# Patient Record
Sex: FEMALE | Race: OTHER | NOT HISPANIC OR LATINO
[De-identification: names, ages, dates, MRNs, and addresses within clinical notes are randomized per-mention and may not be internally consistent; named-entity substitution may affect disease eponyms.]

---

## 2017-06-01 ENCOUNTER — FORM ENCOUNTER (OUTPATIENT)
Age: 52
End: 2017-06-01

## 2021-01-23 ENCOUNTER — INPATIENT (INPATIENT)
Facility: HOSPITAL | Age: 56
LOS: 7 days | Discharge: HOME HEALTH | End: 2021-01-31
Attending: HOSPITALIST | Admitting: HOSPITALIST
Payer: COMMERCIAL

## 2021-01-23 VITALS
OXYGEN SATURATION: 96 % | TEMPERATURE: 99 F | SYSTOLIC BLOOD PRESSURE: 140 MMHG | RESPIRATION RATE: 20 BRPM | HEART RATE: 96 BPM | DIASTOLIC BLOOD PRESSURE: 89 MMHG

## 2021-01-23 DIAGNOSIS — G47.33 OBSTRUCTIVE SLEEP APNEA (ADULT) (PEDIATRIC): ICD-10-CM

## 2021-01-23 DIAGNOSIS — U07.1 COVID-19: ICD-10-CM

## 2021-01-23 DIAGNOSIS — R94.31 ABNORMAL ELECTROCARDIOGRAM [ECG] [EKG]: ICD-10-CM

## 2021-01-23 DIAGNOSIS — E11.9 TYPE 2 DIABETES MELLITUS WITHOUT COMPLICATIONS: ICD-10-CM

## 2021-01-23 DIAGNOSIS — I10 ESSENTIAL (PRIMARY) HYPERTENSION: ICD-10-CM

## 2021-01-23 DIAGNOSIS — Z98.84 BARIATRIC SURGERY STATUS: Chronic | ICD-10-CM

## 2021-01-23 DIAGNOSIS — R09.02 HYPOXEMIA: ICD-10-CM

## 2021-01-23 DIAGNOSIS — Z98.890 OTHER SPECIFIED POSTPROCEDURAL STATES: Chronic | ICD-10-CM

## 2021-01-23 DIAGNOSIS — Z29.9 ENCOUNTER FOR PROPHYLACTIC MEASURES, UNSPECIFIED: ICD-10-CM

## 2021-01-23 LAB
ALBUMIN SERPL ELPH-MCNC: 4.2 G/DL — SIGNIFICANT CHANGE UP (ref 3.3–5)
ALP SERPL-CCNC: 103 U/L — SIGNIFICANT CHANGE UP (ref 40–120)
ALT FLD-CCNC: 27 U/L — SIGNIFICANT CHANGE UP (ref 4–33)
ANION GAP SERPL CALC-SCNC: 15 MMOL/L — HIGH (ref 7–14)
AST SERPL-CCNC: 33 U/L — HIGH (ref 4–32)
BASOPHILS # BLD AUTO: 0.01 K/UL — SIGNIFICANT CHANGE UP (ref 0–0.2)
BASOPHILS NFR BLD AUTO: 0.2 % — SIGNIFICANT CHANGE UP (ref 0–2)
BILIRUB SERPL-MCNC: 0.6 MG/DL — SIGNIFICANT CHANGE UP (ref 0.2–1.2)
BUN SERPL-MCNC: 14 MG/DL — SIGNIFICANT CHANGE UP (ref 7–23)
CALCIUM SERPL-MCNC: 9.5 MG/DL — SIGNIFICANT CHANGE UP (ref 8.4–10.5)
CHLORIDE SERPL-SCNC: 100 MMOL/L — SIGNIFICANT CHANGE UP (ref 98–107)
CO2 SERPL-SCNC: 26 MMOL/L — SIGNIFICANT CHANGE UP (ref 22–31)
CREAT SERPL-MCNC: 0.57 MG/DL — SIGNIFICANT CHANGE UP (ref 0.5–1.3)
CRP SERPL-MCNC: 39.3 MG/L — HIGH
D DIMER BLD IA.RAPID-MCNC: 261 NG/ML DDU — HIGH
EOSINOPHIL # BLD AUTO: 0.01 K/UL — SIGNIFICANT CHANGE UP (ref 0–0.5)
EOSINOPHIL NFR BLD AUTO: 0.2 % — SIGNIFICANT CHANGE UP (ref 0–6)
FERRITIN SERPL-MCNC: 395 NG/ML — HIGH (ref 15–150)
GLUCOSE SERPL-MCNC: 109 MG/DL — HIGH (ref 70–99)
HCT VFR BLD CALC: 46.9 % — HIGH (ref 34.5–45)
HGB BLD-MCNC: 14.4 G/DL — SIGNIFICANT CHANGE UP (ref 11.5–15.5)
IANC: 3.59 K/UL — SIGNIFICANT CHANGE UP (ref 1.5–8.5)
IMM GRANULOCYTES NFR BLD AUTO: 0.4 % — SIGNIFICANT CHANGE UP (ref 0–1.5)
LYMPHOCYTES # BLD AUTO: 1.07 K/UL — SIGNIFICANT CHANGE UP (ref 1–3.3)
LYMPHOCYTES # BLD AUTO: 20.9 % — SIGNIFICANT CHANGE UP (ref 13–44)
MCHC RBC-ENTMCNC: 27.3 PG — SIGNIFICANT CHANGE UP (ref 27–34)
MCHC RBC-ENTMCNC: 30.7 GM/DL — LOW (ref 32–36)
MCV RBC AUTO: 89 FL — SIGNIFICANT CHANGE UP (ref 80–100)
MONOCYTES # BLD AUTO: 0.43 K/UL — SIGNIFICANT CHANGE UP (ref 0–0.9)
MONOCYTES NFR BLD AUTO: 8.4 % — SIGNIFICANT CHANGE UP (ref 2–14)
NEUTROPHILS # BLD AUTO: 3.59 K/UL — SIGNIFICANT CHANGE UP (ref 1.8–7.4)
NEUTROPHILS NFR BLD AUTO: 69.9 % — SIGNIFICANT CHANGE UP (ref 43–77)
NRBC # BLD: 0 /100 WBCS — SIGNIFICANT CHANGE UP
NRBC # FLD: 0 K/UL — SIGNIFICANT CHANGE UP
PLATELET # BLD AUTO: 261 K/UL — SIGNIFICANT CHANGE UP (ref 150–400)
POTASSIUM SERPL-MCNC: 3.8 MMOL/L — SIGNIFICANT CHANGE UP (ref 3.5–5.3)
POTASSIUM SERPL-SCNC: 3.8 MMOL/L — SIGNIFICANT CHANGE UP (ref 3.5–5.3)
PROCALCITONIN SERPL-MCNC: 0.04 NG/ML — SIGNIFICANT CHANGE UP (ref 0.02–0.1)
PROT SERPL-MCNC: 8.1 G/DL — SIGNIFICANT CHANGE UP (ref 6–8.3)
RBC # BLD: 5.27 M/UL — HIGH (ref 3.8–5.2)
RBC # FLD: 12.9 % — SIGNIFICANT CHANGE UP (ref 10.3–14.5)
SARS-COV-2 RNA SPEC QL NAA+PROBE: DETECTED
SODIUM SERPL-SCNC: 141 MMOL/L — SIGNIFICANT CHANGE UP (ref 135–145)
TROPONIN T, HIGH SENSITIVITY RESULT: 10 NG/L — SIGNIFICANT CHANGE UP
WBC # BLD: 5.13 K/UL — SIGNIFICANT CHANGE UP (ref 3.8–10.5)
WBC # FLD AUTO: 5.13 K/UL — SIGNIFICANT CHANGE UP (ref 3.8–10.5)

## 2021-01-23 PROCEDURE — 71045 X-RAY EXAM CHEST 1 VIEW: CPT | Mod: 26

## 2021-01-23 PROCEDURE — 99223 1ST HOSP IP/OBS HIGH 75: CPT

## 2021-01-23 PROCEDURE — 99285 EMERGENCY DEPT VISIT HI MDM: CPT

## 2021-01-23 RX ORDER — BECLOMETHASONE DIPROPIONATE 40 UG/1
1 AEROSOL, METERED RESPIRATORY (INHALATION)
Qty: 0 | Refills: 0 | DISCHARGE

## 2021-01-23 RX ORDER — BECLOMETHASONE DIPROPIONATE 40 UG/1
0 AEROSOL, METERED RESPIRATORY (INHALATION)
Qty: 0 | Refills: 0 | DISCHARGE

## 2021-01-23 RX ORDER — DULOXETINE HYDROCHLORIDE 30 MG/1
0 CAPSULE, DELAYED RELEASE ORAL
Qty: 0 | Refills: 0 | DISCHARGE

## 2021-01-23 RX ORDER — DEXAMETHASONE 0.5 MG/5ML
6 ELIXIR ORAL DAILY
Refills: 0 | Status: DISCONTINUED | OUTPATIENT
Start: 2021-01-24 | End: 2021-01-31

## 2021-01-23 RX ORDER — DULOXETINE HYDROCHLORIDE 30 MG/1
1 CAPSULE, DELAYED RELEASE ORAL
Qty: 0 | Refills: 0 | DISCHARGE

## 2021-01-23 RX ORDER — OMEPRAZOLE 10 MG/1
1 CAPSULE, DELAYED RELEASE ORAL
Qty: 0 | Refills: 0 | DISCHARGE

## 2021-01-23 RX ORDER — VALSARTAN 80 MG/1
160 TABLET ORAL DAILY
Refills: 0 | Status: DISCONTINUED | OUTPATIENT
Start: 2021-01-23 | End: 2021-01-31

## 2021-01-23 RX ORDER — ALBUTEROL 90 UG/1
2 AEROSOL, METERED ORAL EVERY 4 HOURS
Refills: 0 | Status: DISCONTINUED | OUTPATIENT
Start: 2021-01-23 | End: 2021-01-28

## 2021-01-23 RX ORDER — REMDESIVIR 5 MG/ML
INJECTION INTRAVENOUS
Refills: 0 | Status: COMPLETED | OUTPATIENT
Start: 2021-01-23 | End: 2021-01-28

## 2021-01-23 RX ORDER — ALBUTEROL 90 UG/1
2 AEROSOL, METERED ORAL
Qty: 0 | Refills: 0 | DISCHARGE

## 2021-01-23 RX ORDER — GABAPENTIN 400 MG/1
1 CAPSULE ORAL
Qty: 0 | Refills: 0 | DISCHARGE

## 2021-01-23 RX ORDER — PANTOPRAZOLE SODIUM 20 MG/1
40 TABLET, DELAYED RELEASE ORAL
Refills: 0 | Status: DISCONTINUED | OUTPATIENT
Start: 2021-01-23 | End: 2021-01-31

## 2021-01-23 RX ORDER — GABAPENTIN 400 MG/1
0 CAPSULE ORAL
Qty: 0 | Refills: 0 | DISCHARGE

## 2021-01-23 RX ORDER — GABAPENTIN 400 MG/1
300 CAPSULE ORAL
Refills: 0 | Status: DISCONTINUED | OUTPATIENT
Start: 2021-01-23 | End: 2021-01-31

## 2021-01-23 RX ORDER — ACETAMINOPHEN 500 MG
650 TABLET ORAL EVERY 4 HOURS
Refills: 0 | Status: DISCONTINUED | OUTPATIENT
Start: 2021-01-23 | End: 2021-01-31

## 2021-01-23 RX ORDER — ALBUTEROL 90 UG/1
0 AEROSOL, METERED ORAL
Qty: 0 | Refills: 0 | DISCHARGE

## 2021-01-23 RX ADMIN — Medication 125 MILLIGRAM(S): at 17:32

## 2021-01-23 NOTE — ED ADULT TRIAGE NOTE - CHIEF COMPLAINT QUOTE
pt tested positive for covid last week, pt c/o sob  and weakness and cough  pulse ox  on room air 91/ 97 sat on 4l nc /

## 2021-01-23 NOTE — H&P ADULT - ASSESSMENT
55 year old Obese F PMHx HTN, HLD, T2DM, Asthma, Fibromyalgia, GERD, ELINOR presenting with c/o fever, shortness of  breath, cough x 1 week admitted for hypoxia 2/2 COVID  55 year old obese Female with MHx HTN, HLD, Type 2 DM, Asthma, Fibromyalgia, GERD, ELINOR a/w hypoxemia  2/2 COVID-19 and gen weakness

## 2021-01-23 NOTE — ED PROVIDER NOTE - ATTENDING CONTRIBUTION TO CARE
DR. YUSUF, ATTENDING MD-  I performed a face to face bedside interview with the patient regarding history of present illness, review of symptoms and past medical history. I completed an independent physical exam.  I have discussed the patient's plan of care with the resident.   Documentation as above in the note.    54 y/o female h/o htn, dm2, hld, asthma p/w sob, n/v, cough in context of being covid pos.  Requires o2 supp to maintain sat.  Likely covid pna with hypoxia.  Obtain cbc cmp cxr rvp with covid give steroid admit for further care and evaluation.

## 2021-01-23 NOTE — ED ADULT NURSE NOTE - NSIMPLEMENTINTERV_GEN_ALL_ED
Implemented All Universal Safety Interventions:  [employer] to call system. Call bell, personal items and telephone within reach. Instruct patient to call for assistance. Room bathroom lighting operational. Non-slip footwear when patient is off stretcher. Physically safe environment: no spills, clutter or unnecessary equipment. Stretcher in lowest position, wheels locked, appropriate side rails in place.

## 2021-01-23 NOTE — H&P ADULT - PROBLEM SELECTOR PLAN 5
hx of ELINOR  CPAP QHS  monitor on continuous pulse ox patient reports borderline DM   not on any oral meds/insulin   glucose serum -109   check A1c in AM

## 2021-01-23 NOTE — H&P ADULT - PROBLEM SELECTOR PLAN 6
EKG - TWI lead III, avF, V3 EKG - TWI lead III, avF, V3  patient denies any complaints of chest pain   add on Trop to BMP   repeat Trop and EKG in Am EKG - TWI lead III, avF, V3, suspect chronic not acute  patient denies any complaints of chest pain   add on Trop to BMP   repeat Trop and EKG in Am  TTE in AM  No need for Telemetry hx of ELINOR  CPAP QHS  monitor on continuous pulse ox

## 2021-01-23 NOTE — H&P ADULT - PROBLEM SELECTOR PLAN 1
Sp02 90's on RA  Currently on 4L NC 97%   monitor on continuos pulse oximetry  Start Remdesivir x 5 days  patient is s/p IV Solumedrol 125mg x1 in ER   Continue Dexamethasone x 10 days  Tessalon perles PRN Cough Sp02 95% on 4L NC;   monitor on continuos pulse oximetry  Start Remdesivir x 5 days  patient is s/p IV Solumedrol 125mg x1 in ER   Continue Dexamethasone x 10 days  Tessalon perles PRN Cough

## 2021-01-23 NOTE — ED PROVIDER NOTE - CLINICAL SUMMARY MEDICAL DECISION MAKING FREE TEXT BOX
55F PMH HTN, asthma, HDL, chronic LBP, DM2 (not on insulin) presenting with covid PNA requiring supplemental O2. Will do covid labs, CXR, admit.

## 2021-01-23 NOTE — H&P ADULT - PROBLEM SELECTOR PLAN 4
patient reports borderline DM   not on any oral meds/insulin   check A1c  monitor patient reports borderline DM   not on any oral meds/insulin   glucose serum -109   check A1c in AM Likely due to covid-19  check CPK, TSH, ProBNP

## 2021-01-23 NOTE — H&P ADULT - PROBLEM SELECTOR PLAN 7
DVT - Lovenox SQ DVT - Lovenox SQ, covid-19 protocol EKG - TWI lead III, avF, V3, suspect chronic not acute  patient denies any complaints of chest pain   add on Trop to BMP   repeat Trop and EKG in Am  TTE in AM  No need for Telemetry

## 2021-01-23 NOTE — H&P ADULT - NSHPPHYSICALEXAM_GEN_ALL_CORE
Vital Signs Last 24 Hrs  T(C): 37.6 (23 Jan 2021 19:34), Max: 37.7 (23 Jan 2021 17:31)  T(F): 99.7 (23 Jan 2021 19:34), Max: 99.9 (23 Jan 2021 17:31)  HR: 97 (23 Jan 2021 19:34) (96 - 97)  BP: 136/83 (23 Jan 2021 19:34) (136/83 - 153/83)  BP(mean): --  RR: 22 (23 Jan 2021 19:34) (19 - 22)  SpO2: 97% (23 Jan 2021 19:34) (96% - 98%)

## 2021-01-23 NOTE — ED ADULT NURSE NOTE - NS ED NURSE DISCH DISPOSITION
Quality 130: Documentation Of Current Medications In The Medical Record: Current Medications Documented Detail Level: Detailed Quality 226: Preventive Care And Screening: Tobacco Use: Screening And Cessation Intervention: Patient screened for tobacco use and is an ex/non-smoker Admitted

## 2021-01-23 NOTE — H&P ADULT - PROBLEM SELECTOR PROBLEM 5
Obstructive sleep apnea Type 2 diabetes mellitus without complication, without long-term current use of insulin

## 2021-01-23 NOTE — H&P ADULT - PROBLEM SELECTOR PLAN 3
monitor blood pressure  continue valsartan-hctz QD  DASH diet monitor blood pressure  On Valsartan- HCtz QD  Will hold HCTz and continue Valsartan   DASH diet

## 2021-01-23 NOTE — H&P ADULT - NSHPSOCIALHISTORY_GEN_ALL_CORE
Works in [employer].   Former smoker >15 years ago- 1 pack/week.   Drinks alcohol socially - 1 glass of wine - last drink 1 month ago   Ambulates without assistance.

## 2021-01-23 NOTE — H&P ADULT - NSICDXPASTMEDICALHX_GEN_ALL_CORE_FT
PAST MEDICAL HISTORY:  Asthma     DM2 (diabetes mellitus, type 2) borderline DM - not on meds    Fibromyalgia     History of gastroesophageal reflux (GERD)     HLD (hyperlipidemia) not on meds    HTN (hypertension)     Obstructive sleep apnea

## 2021-01-23 NOTE — H&P ADULT - HISTORY OF PRESENT ILLNESS
55 year old Obese F PMHx HTN, HLD, T2DM, Asthma, Fibromyalgia, GERD, ELINOR presenting with c/o fever, shortness of  breath, cough x 1 week. Patient reports decreased appetite due to.loss of taste/smell. She tested positive at [facility] on 1/13. Patient is s/p Zpak and Prednisone finished 5 days course. Patient was using her Albuterol nebulizer q 4 hours however had intermittent nose bleed x 4 days - stopped after a few minutes of holding pressure. Patient reports chest tightness due to frequent dry cough and intermittent wheezing, Mild fever- Tmax 100F. Patient also had 6/10 periumbilical abdominal pain with nausea. No vomiting. Denies sick contacts, Hx of DVT/PE, syncope, LOC, vision change, chest pain, BRBPR, calf pain, lower extremity swelling.     In ED vitals: Temp 99F, HR: 96 BP: 140/89 Sp02: 96% 4L NC Ddimer 261 CRP39 Ferritin 395. Patient is s/p IV Solumedrol 125mg x 1.  55 year old obese Female with MHx HTN, HLD, Type 2 DM, Asthma, Fibromyalgia, GERD, ELINOR presenting with c/o fever, shortness of  breath, cough x 1 week. Patient reports decreased appetite due to loss of taste and smell. She tested Covid-19 positive at [facility] on 1/13. Patient is s/p Zpak and Prednisone - finished 5 days course. Patient was using her Albuterol nebulizer q 4 hours however had intermittent nose bleed x 4 days - stopped after a few minutes of holding pressure. Patient reports chest tightness due to frequent dry cough and intermittent wheezing. Reports fever- Tmax 100F. Patient also had 6/10 periumbilical abdominal pain with nausea. No vomiting. Reports no sick contacts, Hx of DVT/PE, syncope, LOC, vision change, chest pain, BRBPR, calf pain, lower extremity swelling.     ED course: vitals: Temp 99F, HR: 96 BP: 140/89 Sp02: 96% 4L NC, s/p IV Solumedrol 125mg x 1.  55 year old obese Female with MHx HTN, HLD, Type 2 DM, Asthma, Fibromyalgia, GERD, ELINOR presenting with c/o fever, shortness of  breath, cough x 1 week. Patient reports decreased appetite due to loss of taste and smell. She tested Covid-19 positive at [facility] on 1/13. Patient is s/p Zpak and Prednisone - finished 5 days course. Patient was using her Albuterol nebulizer q 4 hours however had intermittent nose bleed x 4 days - stopped after a few minutes of holding pressure. Patient reports chest tightness exclusively present on deep inspiration and pain with dry cough, also intermittent wheezing. Reports temp: Tmax 100F. Patient also had 6/10 periumbilical abdominal pain with nausea. No vomiting. Reports no sick contacts, Hx of DVT/PE, syncope, LOC, vision change, chest pain, BRBPR, calf pain, lower extremity swelling.     ED course: vitals: Temp 99F, HR: 96 BP: 140/89 Sp02: 96% 4L NC, s/p IV Solumedrol 125mg x 1.

## 2021-01-23 NOTE — H&P ADULT - PROBLEM SELECTOR PROBLEM 4
Type 2 diabetes mellitus without complication, without long-term current use of insulin Generalized weakness

## 2021-01-23 NOTE — ED PROVIDER NOTE - OBJECTIVE STATEMENT
55F PMH HTN, asthma, HDL, chronic LBP, DM2 (not on insulin) presenting with a week of covid sxs. Fever, SOB, cough, nausea, some emesis. No diarrhea. Now with worsening SOB to came to ED.

## 2021-01-23 NOTE — H&P ADULT - PROBLEM SELECTOR PLAN 2
CXR- low lung volumes  Start Remdesivir/Dexamethasone  Procalcitonin 0.04 - will hold off on antibiotics   trend inflammatory markers q 72 hours CXR- clear lungs, my reading  Start Remdesivir, Dexamethasone  Procalcitonin 0.04 - will hold off on antibiotics   trend inflammatory markers q 72 hours  Hold HCTZ and SSRI, concerned for hyponatremia in the setting of covid-19

## 2021-01-23 NOTE — H&P ADULT - NSHPLABSRESULTS_GEN_ALL_CORE
14.4   5.13  )-----------( 261      ( 23 Jan 2021 17:35 )             46.9   01-23    141  |  100  |  14  ----------------------------<  109<H>  3.8   |  26  |  0.57    Ca    9.5      23 Jan 2021 17:35    TPro  8.1  /  Alb  4.2  /  TBili  0.6  /  DBili  x   /  AST  33<H>  /  ALT  27  /  AlkPhos  103  01-23      Xray Chest 1 View- PORTABLE-Urgent (01.23.21 @ 16:46) >      INTERPRETATION:  CLINICAL INDICATION: dyspnea, fever    EXAM:  Portable frontal chest from 1/23/2021 at 1646. No prior chest x-ray available at this institution for comparison.    IMPRESSION:  Low lung volumes.    Grossly clear appearing lungs. No pleural effusions or pneumothorax.    Heart size and mediastinal width inaccurately assessed on the projection but do not appear grossly enlarged.    Trachea midline.    Grossly unremarkable osseous structures.    Appearance of surgical clips in left hemiabdomen and upper left epigastric region, correlate with prior surgical history.        EKG: SR @ 91bpm TWI lead III, avF, V3 QTc- 447 EKG, 1/23, nsr 91bpm, qtc 447, Tw inv in III, aVF, V3, no acute ST changes - my reading     CXR : clear lungs, no pleural effusions - my reading       14.4   5.13  )-----------( 261      ( 23 Jan 2021 17:35 )             46.9   01-23    141  |  100  |  14  ----------------------------<  109<H>  3.8   |  26  |  0.57    Ca    9.5      23 Jan 2021 17:35    TPro  8.1  /  Alb  4.2  /  TBili  0.6  /  DBili  x   /  AST  33<H>  /  ALT  27  /  AlkPhos  103  01-23

## 2021-01-23 NOTE — ED ADULT NURSE NOTE - OBJECTIVE STATEMENT
PT AOx4, ambulatory tested positive for covid last week c/o SOB, weakness, and body aches. PT arrives with NC 5L in place sating at 100%. As per triage pt sating at 91% on room air. Pt breathing equal; unlabored on NC 5L. pt on cardiac monitor NSR. pt denies chest pain, dizziness, headache, NVD. EKG performed. 20g placed in left ac. labs sent.

## 2021-01-23 NOTE — H&P ADULT - GEN GEN HX ROS MEA POS PC
Tylenol 2.5ml every 6 hours    Keith Barragan ([facility])     fever/chills/fatigue/weakness

## 2021-01-24 DIAGNOSIS — R53.1 WEAKNESS: ICD-10-CM

## 2021-01-24 LAB
A1C WITH ESTIMATED AVERAGE GLUCOSE RESULT: 6.3 % — HIGH (ref 4–5.6)
ALBUMIN SERPL ELPH-MCNC: 3.8 G/DL — SIGNIFICANT CHANGE UP (ref 3.3–5)
ALP SERPL-CCNC: 93 U/L — SIGNIFICANT CHANGE UP (ref 40–120)
ALT FLD-CCNC: 23 U/L — SIGNIFICANT CHANGE UP (ref 4–33)
ANION GAP SERPL CALC-SCNC: 14 MMOL/L — SIGNIFICANT CHANGE UP (ref 7–14)
AST SERPL-CCNC: 27 U/L — SIGNIFICANT CHANGE UP (ref 4–32)
BILIRUB DIRECT SERPL-MCNC: <0.2 MG/DL — SIGNIFICANT CHANGE UP (ref 0–0.2)
BILIRUB INDIRECT FLD-MCNC: >0.2 MG/DL — SIGNIFICANT CHANGE UP (ref 0–1)
BILIRUB SERPL-MCNC: 0.4 MG/DL — SIGNIFICANT CHANGE UP (ref 0.2–1.2)
BUN SERPL-MCNC: 15 MG/DL — SIGNIFICANT CHANGE UP (ref 7–23)
CALCIUM SERPL-MCNC: 9.3 MG/DL — SIGNIFICANT CHANGE UP (ref 8.4–10.5)
CHLORIDE SERPL-SCNC: 101 MMOL/L — SIGNIFICANT CHANGE UP (ref 98–107)
CHOLEST SERPL-MCNC: 151 MG/DL — SIGNIFICANT CHANGE UP
CK SERPL-CCNC: 69 U/L — SIGNIFICANT CHANGE UP (ref 25–170)
CO2 SERPL-SCNC: 23 MMOL/L — SIGNIFICANT CHANGE UP (ref 22–31)
CREAT SERPL-MCNC: 0.43 MG/DL — LOW (ref 0.5–1.3)
ESTIMATED AVERAGE GLUCOSE: 134 MG/DL — HIGH (ref 68–114)
GLUCOSE SERPL-MCNC: 140 MG/DL — HIGH (ref 70–99)
HCT VFR BLD CALC: 44.1 % — SIGNIFICANT CHANGE UP (ref 34.5–45)
HCV AB S/CO SERPL IA: 0.15 S/CO — SIGNIFICANT CHANGE UP (ref 0–0.99)
HCV AB SERPL-IMP: SIGNIFICANT CHANGE UP
HDLC SERPL-MCNC: 47 MG/DL — LOW
HGB BLD-MCNC: 13.6 G/DL — SIGNIFICANT CHANGE UP (ref 11.5–15.5)
LIPID PNL WITH DIRECT LDL SERPL: 84 MG/DL — SIGNIFICANT CHANGE UP
MAGNESIUM SERPL-MCNC: 2.1 MG/DL — SIGNIFICANT CHANGE UP (ref 1.6–2.6)
MCHC RBC-ENTMCNC: 27.3 PG — SIGNIFICANT CHANGE UP (ref 27–34)
MCHC RBC-ENTMCNC: 30.8 GM/DL — LOW (ref 32–36)
MCV RBC AUTO: 88.4 FL — SIGNIFICANT CHANGE UP (ref 80–100)
NON HDL CHOLESTEROL: 104 MG/DL — SIGNIFICANT CHANGE UP
NRBC # BLD: 0 /100 WBCS — SIGNIFICANT CHANGE UP
NRBC # FLD: 0 K/UL — SIGNIFICANT CHANGE UP
NT-PROBNP SERPL-SCNC: 176 PG/ML — SIGNIFICANT CHANGE UP
PHOSPHATE SERPL-MCNC: 3.8 MG/DL — SIGNIFICANT CHANGE UP (ref 2.5–4.5)
PLATELET # BLD AUTO: 260 K/UL — SIGNIFICANT CHANGE UP (ref 150–400)
POTASSIUM SERPL-MCNC: 4.1 MMOL/L — SIGNIFICANT CHANGE UP (ref 3.5–5.3)
POTASSIUM SERPL-SCNC: 4.1 MMOL/L — SIGNIFICANT CHANGE UP (ref 3.5–5.3)
PROT SERPL-MCNC: 7.5 G/DL — SIGNIFICANT CHANGE UP (ref 6–8.3)
RBC # BLD: 4.99 M/UL — SIGNIFICANT CHANGE UP (ref 3.8–5.2)
RBC # FLD: 12.8 % — SIGNIFICANT CHANGE UP (ref 10.3–14.5)
SARS-COV-2 IGG SERPL QL IA: POSITIVE
SARS-COV-2 IGM SERPL IA-ACNC: 42.5 INDEX — HIGH
SODIUM SERPL-SCNC: 138 MMOL/L — SIGNIFICANT CHANGE UP (ref 135–145)
TRIGL SERPL-MCNC: 102 MG/DL — SIGNIFICANT CHANGE UP
TSH SERPL-MCNC: 0.45 UIU/ML — SIGNIFICANT CHANGE UP (ref 0.27–4.2)
WBC # BLD: 3.88 K/UL — SIGNIFICANT CHANGE UP (ref 3.8–10.5)
WBC # FLD AUTO: 3.88 K/UL — SIGNIFICANT CHANGE UP (ref 3.8–10.5)

## 2021-01-24 PROCEDURE — 99233 SBSQ HOSP IP/OBS HIGH 50: CPT

## 2021-01-24 RX ORDER — REMDESIVIR 5 MG/ML
200 INJECTION INTRAVENOUS EVERY 24 HOURS
Refills: 0 | Status: COMPLETED | OUTPATIENT
Start: 2021-01-23 | End: 2021-01-24

## 2021-01-24 RX ORDER — INFLUENZA VIRUS VACCINE 15; 15; 15; 15 UG/.5ML; UG/.5ML; UG/.5ML; UG/.5ML
0.5 SUSPENSION INTRAMUSCULAR ONCE
Refills: 0 | Status: DISCONTINUED | OUTPATIENT
Start: 2021-01-24 | End: 2021-01-24

## 2021-01-24 RX ORDER — ENOXAPARIN SODIUM 100 MG/ML
40 INJECTION SUBCUTANEOUS EVERY 12 HOURS
Refills: 0 | Status: DISCONTINUED | OUTPATIENT
Start: 2021-01-24 | End: 2021-01-31

## 2021-01-24 RX ORDER — REMDESIVIR 5 MG/ML
100 INJECTION INTRAVENOUS EVERY 24 HOURS
Refills: 0 | Status: COMPLETED | OUTPATIENT
Start: 2021-01-25 | End: 2021-01-28

## 2021-01-24 RX ADMIN — GABAPENTIN 300 MILLIGRAM(S): 400 CAPSULE ORAL at 07:28

## 2021-01-24 RX ADMIN — Medication 6 MILLIGRAM(S): at 07:27

## 2021-01-24 RX ADMIN — PANTOPRAZOLE SODIUM 40 MILLIGRAM(S): 20 TABLET, DELAYED RELEASE ORAL at 07:30

## 2021-01-24 RX ADMIN — VALSARTAN 160 MILLIGRAM(S): 80 TABLET ORAL at 13:27

## 2021-01-24 RX ADMIN — REMDESIVIR 500 MILLIGRAM(S): 5 INJECTION INTRAVENOUS at 07:27

## 2021-01-24 RX ADMIN — GABAPENTIN 300 MILLIGRAM(S): 400 CAPSULE ORAL at 17:13

## 2021-01-24 NOTE — PHYSICAL THERAPY INITIAL EVALUATION ADULT - PERTINENT HX OF CURRENT PROBLEM, REHAB EVAL
patient presents with (+) COVID 19. PMH includes HTN, HLD, Type 2 DM, Asthma, Fibromyalgia, GERD, ELINOR

## 2021-01-24 NOTE — PROGRESS NOTE ADULT - ASSESSMENT
55 year old obese Female with MHx HTN, HLD, Type 2 DM, Asthma, Fibromyalgia, GERD, ELINOR a/w hypoxemia  2/2 COVID-19 and gen weakness

## 2021-01-24 NOTE — PROGRESS NOTE ADULT - PROBLEM SELECTOR PLAN 6
EKG - TWI lead III, avF, V3, suspect chronic changes   patient denies any complaints of chest pain   add on Trop to BMP   Hs trop 10   TTE

## 2021-01-24 NOTE — PROGRESS NOTE ADULT - PROBLEM SELECTOR PLAN 2
Continue Remdesivir  Continue Dexamethasone  Procalcitonin 0.04, no indication for antibiotics   trend inflammatory markers q 72 hours  Supplemental O2

## 2021-01-24 NOTE — PROGRESS NOTE ADULT - SUBJECTIVE AND OBJECTIVE BOX
PROGRESS NOTE:     Patient is a 55y old  Female who presents with a chief complaint of SOB with gen. weakness; (23 Jan 2021 20:36)      SUBJECTIVE / OVERNIGHT EVENTS: No new complaints.     ADDITIONAL REVIEW OF SYSTEMS:    MEDICATIONS  (STANDING):  dexAMETHasone  Injectable 6 milliGRAM(s) IV Push daily  enoxaparin Injectable 40 milliGRAM(s) SubCutaneous every 12 hours  gabapentin 300 milliGRAM(s) Oral two times a day  pantoprazole    Tablet 40 milliGRAM(s) Oral before breakfast  remdesivir  IVPB   IV Intermittent   valsartan 160 milliGRAM(s) Oral daily    MEDICATIONS  (PRN):  acetaminophen   Tablet .. 650 milliGRAM(s) Oral every 4 hours PRN Temp greater or equal to 38.5C (101.3F)  ALBUTerol    90 MICROgram(s) HFA Inhaler 2 Puff(s) Inhalation every 4 hours PRN Shortness of Breath and/or Wheezing  benzonatate 100 milliGRAM(s) Oral every 8 hours PRN Cough      CAPILLARY BLOOD GLUCOSE        I&O's Summary      PHYSICAL EXAM:  Vital Signs Last 24 Hrs  T(C): 36.8 (24 Jan 2021 13:27), Max: 37.7 (23 Jan 2021 17:31)  T(F): 98.2 (24 Jan 2021 13:27), Max: 99.9 (23 Jan 2021 17:31)  HR: 87 (24 Jan 2021 13:27) (81 - 97)  BP: 141/86 (24 Jan 2021 13:27) (136/64 - 153/83)  BP(mean): --  RR: 19 (24 Jan 2021 13:27) (19 - 23)  SpO2: 95% (24 Jan 2021 15:40) (92% - 98%)    CONSTITUTIONAL: NAD, well-developed  RESPIRATORY: Normal respiratory effort; decreased breath sounds b/l   CARDIOVASCULAR: Regular rate and rhythm, normal S1 and S2, no murmur/rub/gallop; No lower extremity edema; Peripheral pulses are 2+ bilaterally  ABDOMEN: Nontender to palpation, normoactive bowel sounds, no rebound/guarding; No hepatosplenomegaly  MUSCLOSKELETAL: no clubbing or cyanosis of digits; no joint swelling or tenderness to palpation  PSYCH: A+O to person, place, and time; affect appropriate    LABS:                        13.6   3.88  )-----------( 260      ( 24 Jan 2021 07:10 )             44.1     01-24    138  |  101  |  15  ----------------------------<  140<H>  4.1   |  23  |  0.43<L>    Ca    9.3      24 Jan 2021 07:10  Phos  3.8     01-24  Mg     2.1     01-24    TPro  7.5  /  Alb  3.8  /  TBili  0.4  /  DBili  <0.2  /  AST  27  /  ALT  23  /  AlkPhos  93  01-24      CARDIAC MARKERS ( 24 Jan 2021 07:10 )  x     / x     / 69 U/L / x     / x                RADIOLOGY & ADDITIONAL TESTS:  Results Reviewed:   Imaging Personally Reviewed:  Electrocardiogram Personally Reviewed:    COORDINATION OF CARE:  Care Discussed with Consultants/Other Providers [Y/N]:  Prior or Outpatient Records Reviewed [Y/N]:

## 2021-01-24 NOTE — PATIENT PROFILE ADULT - NSPROIMPLANTSMEDDEV_GEN_A_NUR
Taltz Pregnancy And Lactation Text: The risk during pregnancy and breastfeeding is uncertain with this medication. None

## 2021-01-24 NOTE — PROGRESS NOTE ADULT - PROBLEM SELECTOR PLAN 1
Acute hypoxic respiratory failure d/t COVID   Currently on 4L nc  monitor on continuos pulse oximetry  Management of COVID as below  CXR w/ grossly clear lungs

## 2021-01-25 LAB
ALBUMIN SERPL ELPH-MCNC: 3.5 G/DL — SIGNIFICANT CHANGE UP (ref 3.3–5)
ALP SERPL-CCNC: 86 U/L — SIGNIFICANT CHANGE UP (ref 40–120)
ALT FLD-CCNC: 20 U/L — SIGNIFICANT CHANGE UP (ref 4–33)
ANION GAP SERPL CALC-SCNC: 16 MMOL/L — HIGH (ref 7–14)
AST SERPL-CCNC: 24 U/L — SIGNIFICANT CHANGE UP (ref 4–32)
BILIRUB DIRECT SERPL-MCNC: <0.2 MG/DL — SIGNIFICANT CHANGE UP (ref 0–0.2)
BILIRUB INDIRECT FLD-MCNC: >0.1 MG/DL — SIGNIFICANT CHANGE UP (ref 0–1)
BILIRUB SERPL-MCNC: 0.3 MG/DL — SIGNIFICANT CHANGE UP (ref 0.2–1.2)
BUN SERPL-MCNC: 23 MG/DL — SIGNIFICANT CHANGE UP (ref 7–23)
CALCIUM SERPL-MCNC: 9.2 MG/DL — SIGNIFICANT CHANGE UP (ref 8.4–10.5)
CHLORIDE SERPL-SCNC: 102 MMOL/L — SIGNIFICANT CHANGE UP (ref 98–107)
CO2 SERPL-SCNC: 24 MMOL/L — SIGNIFICANT CHANGE UP (ref 22–31)
CREAT SERPL-MCNC: 0.51 MG/DL — SIGNIFICANT CHANGE UP (ref 0.5–1.3)
CREAT SERPL-MCNC: 0.51 MG/DL — SIGNIFICANT CHANGE UP (ref 0.5–1.3)
GLUCOSE SERPL-MCNC: 91 MG/DL — SIGNIFICANT CHANGE UP (ref 70–99)
HCT VFR BLD CALC: 43.6 % — SIGNIFICANT CHANGE UP (ref 34.5–45)
HGB BLD-MCNC: 13.3 G/DL — SIGNIFICANT CHANGE UP (ref 11.5–15.5)
MAGNESIUM SERPL-MCNC: 2.1 MG/DL — SIGNIFICANT CHANGE UP (ref 1.6–2.6)
MCHC RBC-ENTMCNC: 27.1 PG — SIGNIFICANT CHANGE UP (ref 27–34)
MCHC RBC-ENTMCNC: 30.5 GM/DL — LOW (ref 32–36)
MCV RBC AUTO: 89 FL — SIGNIFICANT CHANGE UP (ref 80–100)
NRBC # BLD: 0 /100 WBCS — SIGNIFICANT CHANGE UP
NRBC # FLD: 0 K/UL — SIGNIFICANT CHANGE UP
PHOSPHATE SERPL-MCNC: 4 MG/DL — SIGNIFICANT CHANGE UP (ref 2.5–4.5)
PLATELET # BLD AUTO: 321 K/UL — SIGNIFICANT CHANGE UP (ref 150–400)
POTASSIUM SERPL-MCNC: 3.8 MMOL/L — SIGNIFICANT CHANGE UP (ref 3.5–5.3)
POTASSIUM SERPL-SCNC: 3.8 MMOL/L — SIGNIFICANT CHANGE UP (ref 3.5–5.3)
PROT SERPL-MCNC: 7.2 G/DL — SIGNIFICANT CHANGE UP (ref 6–8.3)
RBC # BLD: 4.9 M/UL — SIGNIFICANT CHANGE UP (ref 3.8–5.2)
RBC # FLD: 13 % — SIGNIFICANT CHANGE UP (ref 10.3–14.5)
SODIUM SERPL-SCNC: 142 MMOL/L — SIGNIFICANT CHANGE UP (ref 135–145)
WBC # BLD: 9.17 K/UL — SIGNIFICANT CHANGE UP (ref 3.8–10.5)
WBC # FLD AUTO: 9.17 K/UL — SIGNIFICANT CHANGE UP (ref 3.8–10.5)

## 2021-01-25 PROCEDURE — 99233 SBSQ HOSP IP/OBS HIGH 50: CPT

## 2021-01-25 RX ADMIN — PANTOPRAZOLE SODIUM 40 MILLIGRAM(S): 20 TABLET, DELAYED RELEASE ORAL at 08:02

## 2021-01-25 RX ADMIN — Medication 100 MILLIGRAM(S): at 16:28

## 2021-01-25 RX ADMIN — REMDESIVIR 500 MILLIGRAM(S): 5 INJECTION INTRAVENOUS at 08:02

## 2021-01-25 RX ADMIN — VALSARTAN 160 MILLIGRAM(S): 80 TABLET ORAL at 08:00

## 2021-01-25 RX ADMIN — GABAPENTIN 300 MILLIGRAM(S): 400 CAPSULE ORAL at 16:28

## 2021-01-25 RX ADMIN — GABAPENTIN 300 MILLIGRAM(S): 400 CAPSULE ORAL at 07:55

## 2021-01-25 RX ADMIN — Medication 6 MILLIGRAM(S): at 08:00

## 2021-01-25 NOTE — PROGRESS NOTE ADULT - PROBLEM SELECTOR PLAN 6
EKG - TWI lead III, avF, V3, suspect chronic changes   patient denies any complaints of chest pain   add on Trop to BMP   Hs trop 10   TTE EKG - TWI lead III, avF, V3, suspect chronic changes   patient denies any complaints of chest pain   Hs trop 10   check TTE

## 2021-01-25 NOTE — PROGRESS NOTE ADULT - PROBLEM SELECTOR PLAN 7
DVT - Lovenox SQ, covid-19 protocol DVT - Lovenox SQ, covid-19 protocol  Spoke to daughter on 1/25 and aware of condition

## 2021-01-25 NOTE — PROGRESS NOTE ADULT - ASSESSMENT
55 y.o. Obese Female w/ hx Asthma, ELINOR, HTN, HLD, Type 2 DM, Fibromyalgia, GERD a/w hypoxemia  2/2 COVID-19.

## 2021-01-25 NOTE — PROGRESS NOTE ADULT - SUBJECTIVE AND OBJECTIVE BOX
Patient is a 55y old  Female who presents with a chief complaint of SOB with gen. weakness; (24 Jan 2021 17:10)      SUBJECTIVE / OVERNIGHT EVENTS:    MEDICATIONS  (STANDING):  dexAMETHasone  Injectable 6 milliGRAM(s) IV Push daily  enoxaparin Injectable 40 milliGRAM(s) SubCutaneous every 12 hours  gabapentin 300 milliGRAM(s) Oral two times a day  pantoprazole    Tablet 40 milliGRAM(s) Oral before breakfast  remdesivir  IVPB   IV Intermittent   remdesivir  IVPB 100 milliGRAM(s) IV Intermittent every 24 hours  valsartan 160 milliGRAM(s) Oral daily    MEDICATIONS  (PRN):  acetaminophen   Tablet .. 650 milliGRAM(s) Oral every 4 hours PRN Temp greater or equal to 38.5C (101.3F)  ALBUTerol    90 MICROgram(s) HFA Inhaler 2 Puff(s) Inhalation every 4 hours PRN Shortness of Breath and/or Wheezing  benzonatate 100 milliGRAM(s) Oral every 8 hours PRN Cough      Vital Signs Last 24 Hrs  T(C): 36.8 (24 Jan 2021 13:27), Max: 36.8 (24 Jan 2021 13:27)  T(F): 98.2 (24 Jan 2021 13:27), Max: 98.2 (24 Jan 2021 13:27)  HR: 82 (25 Jan 2021 02:52) (82 - 87)  BP: 141/86 (24 Jan 2021 13:27) (141/86 - 141/86)  BP(mean): --  RR: 19 (24 Jan 2021 13:27) (19 - 19)  SpO2: 96% (25 Jan 2021 08:42) (95% - 96%)  CAPILLARY BLOOD GLUCOSE        I&O's Summary      PHYSICAL EXAM:  GENERAL: NAD, well-developed  HEAD:  Atraumatic, Normocephalic  EYES: EOMI, PERRLA, conjunctiva and sclera clear  NECK: Supple, No JVD  CHEST/LUNG: Clear to auscultation bilaterally; No wheeze  HEART: Regular rate and rhythm; No murmurs, rubs, or gallops  ABDOMEN: Soft, Nontender, Nondistended; Bowel sounds present  EXTREMITIES:  2+ Peripheral Pulses, No clubbing, cyanosis, or edema  PSYCH: AAOx3  NEUROLOGY: non-focal  SKIN: No rashes or lesions    LABS:                        13.3   9.17  )-----------( 321      ( 25 Jan 2021 09:52 )             43.6     01-25    142  |  102  |  23  ----------------------------<  91  3.8   |  24  |  0.51    Ca    9.2      25 Jan 2021 09:52  Phos  4.0     01-25  Mg     2.1     01-25    TPro  7.2  /  Alb  3.5  /  TBili  0.3  /  DBili  <0.2  /  AST  24  /  ALT  20  /  AlkPhos  86  01-25      CARDIAC MARKERS ( 24 Jan 2021 07:10 )  x     / x     / 69 U/L / x     / x              RADIOLOGY & ADDITIONAL TESTS:    Imaging Personally Reviewed:    Consultant(s) Notes Reviewed:      Care Discussed with Consultants/Other Providers:   Patient is a 55y old  Female who presents with a chief complaint of SOB with gen. weakness; (24 Jan 2021 17:10)      SUBJECTIVE / OVERNIGHT EVENTS:  Patient c/o CRAVEN and cough. Denies cp, abdominal pain, N/V/D     MEDICATIONS  (STANDING):  dexAMETHasone  Injectable 6 milliGRAM(s) IV Push daily  enoxaparin Injectable 40 milliGRAM(s) SubCutaneous every 12 hours  gabapentin 300 milliGRAM(s) Oral two times a day  pantoprazole    Tablet 40 milliGRAM(s) Oral before breakfast  remdesivir  IVPB   IV Intermittent   remdesivir  IVPB 100 milliGRAM(s) IV Intermittent every 24 hours  valsartan 160 milliGRAM(s) Oral daily    MEDICATIONS  (PRN):  acetaminophen   Tablet .. 650 milliGRAM(s) Oral every 4 hours PRN Temp greater or equal to 38.5C (101.3F)  ALBUTerol    90 MICROgram(s) HFA Inhaler 2 Puff(s) Inhalation every 4 hours PRN Shortness of Breath and/or Wheezing  benzonatate 100 milliGRAM(s) Oral every 8 hours PRN Cough      Vital Signs Last 24 Hrs  T(C): 36.8 (24 Jan 2021 13:27), Max: 36.8 (24 Jan 2021 13:27)  T(F): 98.2 (24 Jan 2021 13:27), Max: 98.2 (24 Jan 2021 13:27)  HR: 82 (25 Jan 2021 02:52) (82 - 87)  BP: 141/86 (24 Jan 2021 13:27) (141/86 - 141/86)  BP(mean): --  RR: 19 (24 Jan 2021 13:27) (19 - 19)  SpO2: 96% (25 Jan 2021 08:42) (95% - 96%)  CAPILLARY BLOOD GLUCOSE        I&O's Summary      PHYSICAL EXAM:  GENERAL: NAD, well-developed  HEAD:  Atraumatic, Normocephalic  EYES: EOMI, PERRLA, conjunctiva and sclera clear  NECK: Supple, No JVD  CHEST/LUNG: decreased BS bilaterally; No wheeze  HEART: Regular rate and rhythm; No murmurs, rubs, or gallops  ABDOMEN: Soft, Nontender, Nondistended; Bowel sounds present  EXTREMITIES:  2+ Peripheral Pulses, No clubbing, cyanosis, or edema  PSYCH: AAOx3  NEUROLOGY: non-focal  SKIN: No rashes or lesions    LABS:                        13.3   9.17  )-----------( 321      ( 25 Jan 2021 09:52 )             43.6     01-25    142  |  102  |  23  ----------------------------<  91  3.8   |  24  |  0.51    Ca    9.2      25 Jan 2021 09:52  Phos  4.0     01-25  Mg     2.1     01-25    TPro  7.2  /  Alb  3.5  /  TBili  0.3  /  DBili  <0.2  /  AST  24  /  ALT  20  /  AlkPhos  86  01-25      CARDIAC MARKERS ( 24 Jan 2021 07:10 )  x     / x     / 69 U/L / x     / x              RADIOLOGY & ADDITIONAL TESTS:    Imaging Personally Reviewed:    Consultant(s) Notes Reviewed:      Care Discussed with Consultants/Other Providers:

## 2021-01-25 NOTE — PROGRESS NOTE ADULT - PROBLEM SELECTOR PLAN 1
Acute hypoxic respiratory failure d/t COVID   Currently on 4L nc  monitor on continuos pulse oximetry  Management of COVID as below  CXR w/ grossly clear lungs Acute hypoxic respiratory failure d/t COVID   Currently on 4LNC  monitor on continuos pulse oximetry  Management of COVID as below  CXR w/ grossly clear lungs

## 2021-01-25 NOTE — PROGRESS NOTE ADULT - PROBLEM SELECTOR PLAN 2
Continue Remdesivir D#3/5  Continue Dexamethasone D#2/10  Procalcitonin 0.04, no indication for antibiotics   trend inflammatory markers q 72 hours  Supplemental O2

## 2021-01-26 LAB
ALBUMIN SERPL ELPH-MCNC: 3.3 G/DL — SIGNIFICANT CHANGE UP (ref 3.3–5)
ALP SERPL-CCNC: 74 U/L — SIGNIFICANT CHANGE UP (ref 40–120)
ALT FLD-CCNC: 18 U/L — SIGNIFICANT CHANGE UP (ref 4–33)
ANION GAP SERPL CALC-SCNC: 11 MMOL/L — SIGNIFICANT CHANGE UP (ref 7–14)
AST SERPL-CCNC: 17 U/L — SIGNIFICANT CHANGE UP (ref 4–32)
BASOPHILS # BLD AUTO: 0 K/UL — SIGNIFICANT CHANGE UP (ref 0–0.2)
BASOPHILS NFR BLD AUTO: 0 % — SIGNIFICANT CHANGE UP (ref 0–2)
BILIRUB SERPL-MCNC: 0.3 MG/DL — SIGNIFICANT CHANGE UP (ref 0.2–1.2)
BUN SERPL-MCNC: 21 MG/DL — SIGNIFICANT CHANGE UP (ref 7–23)
CALCIUM SERPL-MCNC: 9.4 MG/DL — SIGNIFICANT CHANGE UP (ref 8.4–10.5)
CHLORIDE SERPL-SCNC: 103 MMOL/L — SIGNIFICANT CHANGE UP (ref 98–107)
CO2 SERPL-SCNC: 26 MMOL/L — SIGNIFICANT CHANGE UP (ref 22–31)
CREAT SERPL-MCNC: 0.49 MG/DL — LOW (ref 0.5–1.3)
CRP SERPL-MCNC: 9 MG/L — HIGH
D DIMER BLD IA.RAPID-MCNC: 404 NG/ML DDU — HIGH
EOSINOPHIL # BLD AUTO: 0 K/UL — SIGNIFICANT CHANGE UP (ref 0–0.5)
EOSINOPHIL NFR BLD AUTO: 0 % — SIGNIFICANT CHANGE UP (ref 0–6)
FERRITIN SERPL-MCNC: 298 NG/ML — HIGH (ref 15–150)
GIANT PLATELETS BLD QL SMEAR: PRESENT — SIGNIFICANT CHANGE UP
GLUCOSE SERPL-MCNC: 113 MG/DL — HIGH (ref 70–99)
HCT VFR BLD CALC: 41.3 % — SIGNIFICANT CHANGE UP (ref 34.5–45)
HGB BLD-MCNC: 13 G/DL — SIGNIFICANT CHANGE UP (ref 11.5–15.5)
IANC: 5.72 K/UL — SIGNIFICANT CHANGE UP (ref 1.5–8.5)
LDH SERPL L TO P-CCNC: 249 U/L — HIGH (ref 135–225)
LYMPHOCYTES # BLD AUTO: 1.37 K/UL — SIGNIFICANT CHANGE UP (ref 1–3.3)
LYMPHOCYTES # BLD AUTO: 16.1 % — SIGNIFICANT CHANGE UP (ref 13–44)
MAGNESIUM SERPL-MCNC: 2.2 MG/DL — SIGNIFICANT CHANGE UP (ref 1.6–2.6)
MANUAL SMEAR VERIFICATION: SIGNIFICANT CHANGE UP
MCHC RBC-ENTMCNC: 27.9 PG — SIGNIFICANT CHANGE UP (ref 27–34)
MCHC RBC-ENTMCNC: 31.5 GM/DL — LOW (ref 32–36)
MCV RBC AUTO: 88.6 FL — SIGNIFICANT CHANGE UP (ref 80–100)
MONOCYTES # BLD AUTO: 0.45 K/UL — SIGNIFICANT CHANGE UP (ref 0–0.9)
MONOCYTES NFR BLD AUTO: 5.3 % — SIGNIFICANT CHANGE UP (ref 2–14)
NEUTROPHILS # BLD AUTO: 6.63 K/UL — SIGNIFICANT CHANGE UP (ref 1.8–7.4)
NEUTROPHILS NFR BLD AUTO: 77.7 % — HIGH (ref 43–77)
PLAT MORPH BLD: NORMAL — SIGNIFICANT CHANGE UP
PLATELET # BLD AUTO: 313 K/UL — SIGNIFICANT CHANGE UP (ref 150–400)
PLATELET COUNT - ESTIMATE: NORMAL — SIGNIFICANT CHANGE UP
POIKILOCYTOSIS BLD QL AUTO: SIGNIFICANT CHANGE UP
POTASSIUM SERPL-MCNC: 3.7 MMOL/L — SIGNIFICANT CHANGE UP (ref 3.5–5.3)
POTASSIUM SERPL-SCNC: 3.7 MMOL/L — SIGNIFICANT CHANGE UP (ref 3.5–5.3)
PROCALCITONIN SERPL-MCNC: 0.04 NG/ML — SIGNIFICANT CHANGE UP (ref 0.02–0.1)
PROT SERPL-MCNC: 7 G/DL — SIGNIFICANT CHANGE UP (ref 6–8.3)
RBC # BLD: 4.66 M/UL — SIGNIFICANT CHANGE UP (ref 3.8–5.2)
RBC # FLD: 12.9 % — SIGNIFICANT CHANGE UP (ref 10.3–14.5)
RBC BLD AUTO: NORMAL — SIGNIFICANT CHANGE UP
SMUDGE CELLS # BLD: PRESENT — SIGNIFICANT CHANGE UP
SODIUM SERPL-SCNC: 140 MMOL/L — SIGNIFICANT CHANGE UP (ref 135–145)
SPHEROCYTES BLD QL SMEAR: SIGNIFICANT CHANGE UP
VARIANT LYMPHS # BLD: 0.9 % — SIGNIFICANT CHANGE UP (ref 0–6)
WBC # BLD: 8.53 K/UL — SIGNIFICANT CHANGE UP (ref 3.8–10.5)
WBC # FLD AUTO: 8.53 K/UL — SIGNIFICANT CHANGE UP (ref 3.8–10.5)

## 2021-01-26 PROCEDURE — 99233 SBSQ HOSP IP/OBS HIGH 50: CPT

## 2021-01-26 RX ADMIN — PANTOPRAZOLE SODIUM 40 MILLIGRAM(S): 20 TABLET, DELAYED RELEASE ORAL at 07:08

## 2021-01-26 RX ADMIN — GABAPENTIN 300 MILLIGRAM(S): 400 CAPSULE ORAL at 07:08

## 2021-01-26 RX ADMIN — ENOXAPARIN SODIUM 40 MILLIGRAM(S): 100 INJECTION SUBCUTANEOUS at 07:08

## 2021-01-26 RX ADMIN — GABAPENTIN 300 MILLIGRAM(S): 400 CAPSULE ORAL at 16:17

## 2021-01-26 RX ADMIN — REMDESIVIR 500 MILLIGRAM(S): 5 INJECTION INTRAVENOUS at 07:29

## 2021-01-26 RX ADMIN — VALSARTAN 160 MILLIGRAM(S): 80 TABLET ORAL at 09:28

## 2021-01-26 RX ADMIN — Medication 6 MILLIGRAM(S): at 07:08

## 2021-01-26 NOTE — PROGRESS NOTE ADULT - SUBJECTIVE AND OBJECTIVE BOX
Patient is a 55y old  Female who presents with a chief complaint of SOB with gen. weakness; (25 Jan 2021 11:48)      SUBJECTIVE / OVERNIGHT EVENTS:    MEDICATIONS  (STANDING):  dexAMETHasone  Injectable 6 milliGRAM(s) IV Push daily  enoxaparin Injectable 40 milliGRAM(s) SubCutaneous every 12 hours  gabapentin 300 milliGRAM(s) Oral two times a day  pantoprazole    Tablet 40 milliGRAM(s) Oral before breakfast  remdesivir  IVPB   IV Intermittent   remdesivir  IVPB 100 milliGRAM(s) IV Intermittent every 24 hours  valsartan 160 milliGRAM(s) Oral daily    MEDICATIONS  (PRN):  acetaminophen   Tablet .. 650 milliGRAM(s) Oral every 4 hours PRN Temp greater or equal to 38.5C (101.3F)  ALBUTerol    90 MICROgram(s) HFA Inhaler 2 Puff(s) Inhalation every 4 hours PRN Shortness of Breath and/or Wheezing  benzonatate 100 milliGRAM(s) Oral every 8 hours PRN Cough  guaiFENesin   Syrup  (Sugar-Free) 100 milliGRAM(s) Oral every 6 hours PRN Cough      Vital Signs Last 24 Hrs  T(C): 36.4 (26 Jan 2021 06:10), Max: 36.4 (25 Jan 2021 12:16)  T(F): 97.5 (26 Jan 2021 06:10), Max: 97.6 (25 Jan 2021 12:16)  HR: 70 (26 Jan 2021 06:10) (59 - 76)  BP: 119/72 (26 Jan 2021 06:10) (102/60 - 119/72)  BP(mean): --  RR: 17 (26 Jan 2021 06:10) (17 - 18)  SpO2: 94% (26 Jan 2021 06:10) (94% - 96%)  CAPILLARY BLOOD GLUCOSE        I&O's Summary      PHYSICAL EXAM:  GENERAL: NAD, well-developed  HEAD:  Atraumatic, Normocephalic  EYES: EOMI, PERRLA, conjunctiva and sclera clear  NECK: Supple, No JVD  CHEST/LUNG: Clear to auscultation bilaterally; No wheeze  HEART: Regular rate and rhythm; No murmurs, rubs, or gallops  ABDOMEN: Soft, Nontender, Nondistended; Bowel sounds present  EXTREMITIES:  2+ Peripheral Pulses, No clubbing, cyanosis, or edema  PSYCH: AAOx3  NEUROLOGY: non-focal  SKIN: No rashes or lesions    LABS:                        13.0   8.53  )-----------( 313      ( 26 Jan 2021 07:39 )             41.3     01-26    140  |  103  |  21  ----------------------------<  113<H>  3.7   |  26  |  0.49<L>    Ca    9.4      26 Jan 2021 07:39  Phos  4.0     01-25  Mg     2.2     01-26    TPro  7.0  /  Alb  3.3  /  TBili  0.3  /  DBili  x   /  AST  17  /  ALT  18  /  AlkPhos  74  01-26              RADIOLOGY & ADDITIONAL TESTS:    Imaging Personally Reviewed:    Consultant(s) Notes Reviewed:      Care Discussed with Consultants/Other Providers:   Patient is a 55y old  Female who presents with a chief complaint of SOB with gen. weakness; (25 Jan 2021 11:48)      SUBJECTIVE / OVERNIGHT EVENTS:  Patient has no new complaints. Denies cp, SOB, abdominal pain, N/V/D     MEDICATIONS  (STANDING):  dexAMETHasone  Injectable 6 milliGRAM(s) IV Push daily  enoxaparin Injectable 40 milliGRAM(s) SubCutaneous every 12 hours  gabapentin 300 milliGRAM(s) Oral two times a day  pantoprazole    Tablet 40 milliGRAM(s) Oral before breakfast  remdesivir  IVPB   IV Intermittent   remdesivir  IVPB 100 milliGRAM(s) IV Intermittent every 24 hours  valsartan 160 milliGRAM(s) Oral daily    MEDICATIONS  (PRN):  acetaminophen   Tablet .. 650 milliGRAM(s) Oral every 4 hours PRN Temp greater or equal to 38.5C (101.3F)  ALBUTerol    90 MICROgram(s) HFA Inhaler 2 Puff(s) Inhalation every 4 hours PRN Shortness of Breath and/or Wheezing  benzonatate 100 milliGRAM(s) Oral every 8 hours PRN Cough  guaiFENesin   Syrup  (Sugar-Free) 100 milliGRAM(s) Oral every 6 hours PRN Cough      Vital Signs Last 24 Hrs  T(C): 36.4 (26 Jan 2021 06:10), Max: 36.4 (25 Jan 2021 12:16)  T(F): 97.5 (26 Jan 2021 06:10), Max: 97.6 (25 Jan 2021 12:16)  HR: 70 (26 Jan 2021 06:10) (59 - 76)  BP: 119/72 (26 Jan 2021 06:10) (102/60 - 119/72)  BP(mean): --  RR: 17 (26 Jan 2021 06:10) (17 - 18)  SpO2: 94% (26 Jan 2021 06:10) (94% - 96%)  CAPILLARY BLOOD GLUCOSE        I&O's Summary      PHYSICAL EXAM:  GENERAL: NAD, well-developed  HEAD:  Atraumatic, Normocephalic  EYES: EOMI, PERRLA, conjunctiva and sclera clear  NECK: Supple, No JVD  CHEST/LUNG: decreased BS bilaterally; No wheeze  HEART: Regular rate and rhythm; No murmurs, rubs, or gallops  ABDOMEN: Soft, Nontender, Nondistended; Bowel sounds present  EXTREMITIES:  2+ Peripheral Pulses, No clubbing, cyanosis, or edema  PSYCH: AAOx3  NEUROLOGY: non-focal  SKIN: No rashes or lesions    LABS:                        13.0   8.53  )-----------( 313      ( 26 Jan 2021 07:39 )             41.3     01-26    140  |  103  |  21  ----------------------------<  113<H>  3.7   |  26  |  0.49<L>    Ca    9.4      26 Jan 2021 07:39  Phos  4.0     01-25  Mg     2.2     01-26    TPro  7.0  /  Alb  3.3  /  TBili  0.3  /  DBili  x   /  AST  17  /  ALT  18  /  AlkPhos  74  01-26              RADIOLOGY & ADDITIONAL TESTS:    Imaging Personally Reviewed:    Consultant(s) Notes Reviewed:      Care Discussed with Consultants/Other Providers:

## 2021-01-26 NOTE — PROGRESS NOTE ADULT - PROBLEM SELECTOR PLAN 6
EKG - TWI lead III, avF, V3, suspect chronic changes   patient denies any complaints of chest pain   Hs trop 10   check TTE

## 2021-01-26 NOTE — PROGRESS NOTE ADULT - PROBLEM SELECTOR PLAN 2
Continue Remdesivir D#4/5  Continue Dexamethasone D#3/10  Procalcitonin 0.04, no indication for antibiotics   trend inflammatory markers q 72 hours  Supplemental O2

## 2021-01-26 NOTE — PROGRESS NOTE ADULT - PROBLEM SELECTOR PLAN 1
Acute hypoxic respiratory failure d/t COVID   Currently on 3LNC  monitor on continuos pulse oximetry  Management of COVID as below  CXR w/ grossly clear lungs

## 2021-01-27 LAB
ALBUMIN SERPL ELPH-MCNC: 3.2 G/DL — LOW (ref 3.3–5)
ALP SERPL-CCNC: 72 U/L — SIGNIFICANT CHANGE UP (ref 40–120)
ALT FLD-CCNC: 18 U/L — SIGNIFICANT CHANGE UP (ref 4–33)
ANION GAP SERPL CALC-SCNC: 12 MMOL/L — SIGNIFICANT CHANGE UP (ref 7–14)
AST SERPL-CCNC: 18 U/L — SIGNIFICANT CHANGE UP (ref 4–32)
BASOPHILS # BLD AUTO: 0.02 K/UL — SIGNIFICANT CHANGE UP (ref 0–0.2)
BASOPHILS NFR BLD AUTO: 0.2 % — SIGNIFICANT CHANGE UP (ref 0–2)
BILIRUB SERPL-MCNC: 0.3 MG/DL — SIGNIFICANT CHANGE UP (ref 0.2–1.2)
BUN SERPL-MCNC: 17 MG/DL — SIGNIFICANT CHANGE UP (ref 7–23)
CALCIUM SERPL-MCNC: 9.1 MG/DL — SIGNIFICANT CHANGE UP (ref 8.4–10.5)
CHLORIDE SERPL-SCNC: 105 MMOL/L — SIGNIFICANT CHANGE UP (ref 98–107)
CO2 SERPL-SCNC: 25 MMOL/L — SIGNIFICANT CHANGE UP (ref 22–31)
CREAT SERPL-MCNC: 0.5 MG/DL — SIGNIFICANT CHANGE UP (ref 0.5–1.3)
EOSINOPHIL # BLD AUTO: 0 K/UL — SIGNIFICANT CHANGE UP (ref 0–0.5)
EOSINOPHIL NFR BLD AUTO: 0 % — SIGNIFICANT CHANGE UP (ref 0–6)
GLUCOSE SERPL-MCNC: 98 MG/DL — SIGNIFICANT CHANGE UP (ref 70–99)
HCT VFR BLD CALC: 41.3 % — SIGNIFICANT CHANGE UP (ref 34.5–45)
HGB BLD-MCNC: 13.1 G/DL — SIGNIFICANT CHANGE UP (ref 11.5–15.5)
IANC: 5.86 K/UL — SIGNIFICANT CHANGE UP (ref 1.5–8.5)
IMM GRANULOCYTES NFR BLD AUTO: 1.1 % — SIGNIFICANT CHANGE UP (ref 0–1.5)
LYMPHOCYTES # BLD AUTO: 2.13 K/UL — SIGNIFICANT CHANGE UP (ref 1–3.3)
LYMPHOCYTES # BLD AUTO: 24.3 % — SIGNIFICANT CHANGE UP (ref 13–44)
MAGNESIUM SERPL-MCNC: 2.4 MG/DL — SIGNIFICANT CHANGE UP (ref 1.6–2.6)
MCHC RBC-ENTMCNC: 27.5 PG — SIGNIFICANT CHANGE UP (ref 27–34)
MCHC RBC-ENTMCNC: 31.7 GM/DL — LOW (ref 32–36)
MCV RBC AUTO: 86.6 FL — SIGNIFICANT CHANGE UP (ref 80–100)
MONOCYTES # BLD AUTO: 0.66 K/UL — SIGNIFICANT CHANGE UP (ref 0–0.9)
MONOCYTES NFR BLD AUTO: 7.5 % — SIGNIFICANT CHANGE UP (ref 2–14)
NEUTROPHILS # BLD AUTO: 5.86 K/UL — SIGNIFICANT CHANGE UP (ref 1.8–7.4)
NEUTROPHILS NFR BLD AUTO: 66.9 % — SIGNIFICANT CHANGE UP (ref 43–77)
NRBC # BLD: 0 /100 WBCS — SIGNIFICANT CHANGE UP
NRBC # FLD: 0 K/UL — SIGNIFICANT CHANGE UP
PLATELET # BLD AUTO: 336 K/UL — SIGNIFICANT CHANGE UP (ref 150–400)
POTASSIUM SERPL-MCNC: 4.1 MMOL/L — SIGNIFICANT CHANGE UP (ref 3.5–5.3)
POTASSIUM SERPL-SCNC: 4.1 MMOL/L — SIGNIFICANT CHANGE UP (ref 3.5–5.3)
PROT SERPL-MCNC: 6.7 G/DL — SIGNIFICANT CHANGE UP (ref 6–8.3)
RBC # BLD: 4.77 M/UL — SIGNIFICANT CHANGE UP (ref 3.8–5.2)
RBC # FLD: 12.7 % — SIGNIFICANT CHANGE UP (ref 10.3–14.5)
SODIUM SERPL-SCNC: 142 MMOL/L — SIGNIFICANT CHANGE UP (ref 135–145)
WBC # BLD: 8.77 K/UL — SIGNIFICANT CHANGE UP (ref 3.8–10.5)
WBC # FLD AUTO: 8.77 K/UL — SIGNIFICANT CHANGE UP (ref 3.8–10.5)

## 2021-01-27 PROCEDURE — 99233 SBSQ HOSP IP/OBS HIGH 50: CPT

## 2021-01-27 RX ADMIN — PANTOPRAZOLE SODIUM 40 MILLIGRAM(S): 20 TABLET, DELAYED RELEASE ORAL at 06:06

## 2021-01-27 RX ADMIN — Medication 6 MILLIGRAM(S): at 06:06

## 2021-01-27 RX ADMIN — ENOXAPARIN SODIUM 40 MILLIGRAM(S): 100 INJECTION SUBCUTANEOUS at 09:14

## 2021-01-27 RX ADMIN — VALSARTAN 160 MILLIGRAM(S): 80 TABLET ORAL at 06:06

## 2021-01-27 RX ADMIN — GABAPENTIN 300 MILLIGRAM(S): 400 CAPSULE ORAL at 18:27

## 2021-01-27 RX ADMIN — ENOXAPARIN SODIUM 40 MILLIGRAM(S): 100 INJECTION SUBCUTANEOUS at 22:53

## 2021-01-27 RX ADMIN — REMDESIVIR 500 MILLIGRAM(S): 5 INJECTION INTRAVENOUS at 06:07

## 2021-01-27 RX ADMIN — GABAPENTIN 300 MILLIGRAM(S): 400 CAPSULE ORAL at 06:06

## 2021-01-27 NOTE — PROGRESS NOTE ADULT - SUBJECTIVE AND OBJECTIVE BOX
Patient is a 55y old  Female who presents with a chief complaint of SOB with gen. weakness; (26 Jan 2021 10:48)      SUBJECTIVE / OVERNIGHT EVENTS:    MEDICATIONS  (STANDING):  dexAMETHasone  Injectable 6 milliGRAM(s) IV Push daily  enoxaparin Injectable 40 milliGRAM(s) SubCutaneous every 12 hours  gabapentin 300 milliGRAM(s) Oral two times a day  pantoprazole    Tablet 40 milliGRAM(s) Oral before breakfast  remdesivir  IVPB   IV Intermittent   remdesivir  IVPB 100 milliGRAM(s) IV Intermittent every 24 hours  valsartan 160 milliGRAM(s) Oral daily    MEDICATIONS  (PRN):  acetaminophen   Tablet .. 650 milliGRAM(s) Oral every 4 hours PRN Temp greater or equal to 38.5C (101.3F)  ALBUTerol    90 MICROgram(s) HFA Inhaler 2 Puff(s) Inhalation every 4 hours PRN Shortness of Breath and/or Wheezing  benzonatate 100 milliGRAM(s) Oral every 8 hours PRN Cough  guaiFENesin   Syrup  (Sugar-Free) 100 milliGRAM(s) Oral every 6 hours PRN Cough      Vital Signs Last 24 Hrs  T(C): 37.2 (27 Jan 2021 09:12), Max: 37.2 (27 Jan 2021 09:12)  T(F): 98.9 (27 Jan 2021 09:12), Max: 98.9 (27 Jan 2021 09:12)  HR: 64 (27 Jan 2021 09:43) (63 - 91)  BP: 113/56 (27 Jan 2021 09:12) (113/56 - 122/69)  BP(mean): --  RR: 18 (27 Jan 2021 09:12) (18 - 20)  SpO2: 94% (27 Jan 2021 09:43) (90% - 95%)  CAPILLARY BLOOD GLUCOSE        I&O's Summary      PHYSICAL EXAM:  GENERAL: NAD, well-developed  HEAD:  Atraumatic, Normocephalic  EYES: EOMI, PERRLA, conjunctiva and sclera clear  NECK: Supple, No JVD  CHEST/LUNG: Clear to auscultation bilaterally; No wheeze  HEART: Regular rate and rhythm; No murmurs, rubs, or gallops  ABDOMEN: Soft, Nontender, Nondistended; Bowel sounds present  EXTREMITIES:  2+ Peripheral Pulses, No clubbing, cyanosis, or edema  PSYCH: AAOx3  NEUROLOGY: non-focal  SKIN: No rashes or lesions    LABS:                        13.1   8.77  )-----------( 336      ( 27 Jan 2021 05:10 )             41.3     01-27    142  |  105  |  17  ----------------------------<  98  4.1   |  25  |  0.50    Ca    9.1      27 Jan 2021 05:10  Mg     2.4     01-27    TPro  6.7  /  Alb  3.2<L>  /  TBili  0.3  /  DBili  x   /  AST  18  /  ALT  18  /  AlkPhos  72  01-27              RADIOLOGY & ADDITIONAL TESTS:    Imaging Personally Reviewed:    Consultant(s) Notes Reviewed:      Care Discussed with Consultants/Other Providers:   Patient is a 55y old  Female who presents with a chief complaint of SOB with gen. weakness; (26 Jan 2021 10:48)      SUBJECTIVE / OVERNIGHT EVENTS:  Patient very winded with speaking. c/o SOB with ambulation. Denies cp,  abdominal pain, N/V/D     MEDICATIONS  (STANDING):  dexAMETHasone  Injectable 6 milliGRAM(s) IV Push daily  enoxaparin Injectable 40 milliGRAM(s) SubCutaneous every 12 hours  gabapentin 300 milliGRAM(s) Oral two times a day  pantoprazole    Tablet 40 milliGRAM(s) Oral before breakfast  remdesivir  IVPB   IV Intermittent   remdesivir  IVPB 100 milliGRAM(s) IV Intermittent every 24 hours  valsartan 160 milliGRAM(s) Oral daily    MEDICATIONS  (PRN):  acetaminophen   Tablet .. 650 milliGRAM(s) Oral every 4 hours PRN Temp greater or equal to 38.5C (101.3F)  ALBUTerol    90 MICROgram(s) HFA Inhaler 2 Puff(s) Inhalation every 4 hours PRN Shortness of Breath and/or Wheezing  benzonatate 100 milliGRAM(s) Oral every 8 hours PRN Cough  guaiFENesin   Syrup  (Sugar-Free) 100 milliGRAM(s) Oral every 6 hours PRN Cough      Vital Signs Last 24 Hrs  T(C): 37.2 (27 Jan 2021 09:12), Max: 37.2 (27 Jan 2021 09:12)  T(F): 98.9 (27 Jan 2021 09:12), Max: 98.9 (27 Jan 2021 09:12)  HR: 64 (27 Jan 2021 09:43) (63 - 91)  BP: 113/56 (27 Jan 2021 09:12) (113/56 - 122/69)  BP(mean): --  RR: 18 (27 Jan 2021 09:12) (18 - 20)  SpO2: 94% (27 Jan 2021 09:43) (90% - 95%)  CAPILLARY BLOOD GLUCOSE        I&O's Summary      PHYSICAL EXAM:  GENERAL: NAD, well-developed  HEAD:  Atraumatic, Normocephalic  EYES: EOMI, PERRLA, conjunctiva and sclera clear  NECK: Supple, No JVD  CHEST/LUNG: bibasilar crackles; No wheeze  HEART: Regular rate and rhythm; No murmurs, rubs, or gallops  ABDOMEN: Soft, Nontender, Nondistended; Bowel sounds present  EXTREMITIES:  2+ Peripheral Pulses, No clubbing, cyanosis, or edema  PSYCH: AAOx3  NEUROLOGY: non-focal  SKIN: No rashes or lesions    LABS:                        13.1   8.77  )-----------( 336      ( 27 Jan 2021 05:10 )             41.3     01-27    142  |  105  |  17  ----------------------------<  98  4.1   |  25  |  0.50    Ca    9.1      27 Jan 2021 05:10  Mg     2.4     01-27    TPro  6.7  /  Alb  3.2<L>  /  TBili  0.3  /  DBili  x   /  AST  18  /  ALT  18  /  AlkPhos  72  01-27              RADIOLOGY & ADDITIONAL TESTS:    Imaging Personally Reviewed:    Consultant(s) Notes Reviewed:      Care Discussed with Consultants/Other Providers:

## 2021-01-27 NOTE — PROGRESS NOTE ADULT - PROBLEM SELECTOR PLAN 1
Acute hypoxic respiratory failure d/t COVID   Currently on RA  monitor on continuos pulse oximetry  Management of COVID as below  CXR w/ grossly clear lungs Acute hypoxic respiratory failure d/t COVID   Currently on RA; but hypoxic with ambulation and speaking  encouraged incentive spirometry   monitor on continuos pulse oximetry  Management of COVID as below  CXR w/ grossly clear lungs

## 2021-01-27 NOTE — PROGRESS NOTE ADULT - PROBLEM SELECTOR PLAN 2
Continue Remdesivir D#5/5  Continue Dexamethasone D#4/10  Procalcitonin 0.04, no indication for antibiotics   trend inflammatory markers q 72 hours  Supplemental O2

## 2021-01-27 NOTE — PROGRESS NOTE ADULT - PROBLEM SELECTOR PLAN 7
DVT - Lovenox SQ, covid-19 protocol  Spoke to daughter on 1/25 and aware of condition DVT - Lovenox SQ, covid-19 protocol  Spoke to daughter on 1/27 and aware of condition

## 2021-01-28 LAB
ALBUMIN SERPL ELPH-MCNC: 3.1 G/DL — LOW (ref 3.3–5)
ALP SERPL-CCNC: 69 U/L — SIGNIFICANT CHANGE UP (ref 40–120)
ALT FLD-CCNC: 16 U/L — SIGNIFICANT CHANGE UP (ref 4–33)
ANION GAP SERPL CALC-SCNC: 13 MMOL/L — SIGNIFICANT CHANGE UP (ref 7–14)
AST SERPL-CCNC: 20 U/L — SIGNIFICANT CHANGE UP (ref 4–32)
BASOPHILS # BLD AUTO: 0.01 K/UL — SIGNIFICANT CHANGE UP (ref 0–0.2)
BASOPHILS NFR BLD AUTO: 0.1 % — SIGNIFICANT CHANGE UP (ref 0–2)
BILIRUB SERPL-MCNC: 0.3 MG/DL — SIGNIFICANT CHANGE UP (ref 0.2–1.2)
BUN SERPL-MCNC: 18 MG/DL — SIGNIFICANT CHANGE UP (ref 7–23)
CALCIUM SERPL-MCNC: 8.8 MG/DL — SIGNIFICANT CHANGE UP (ref 8.4–10.5)
CHLORIDE SERPL-SCNC: 106 MMOL/L — SIGNIFICANT CHANGE UP (ref 98–107)
CO2 SERPL-SCNC: 24 MMOL/L — SIGNIFICANT CHANGE UP (ref 22–31)
CREAT SERPL-MCNC: 0.52 MG/DL — SIGNIFICANT CHANGE UP (ref 0.5–1.3)
EOSINOPHIL # BLD AUTO: 0.01 K/UL — SIGNIFICANT CHANGE UP (ref 0–0.5)
EOSINOPHIL NFR BLD AUTO: 0.1 % — SIGNIFICANT CHANGE UP (ref 0–6)
GLUCOSE SERPL-MCNC: 88 MG/DL — SIGNIFICANT CHANGE UP (ref 70–99)
HCT VFR BLD CALC: 40.5 % — SIGNIFICANT CHANGE UP (ref 34.5–45)
HGB BLD-MCNC: 12.8 G/DL — SIGNIFICANT CHANGE UP (ref 11.5–15.5)
IANC: 6.05 K/UL — SIGNIFICANT CHANGE UP (ref 1.5–8.5)
IMM GRANULOCYTES NFR BLD AUTO: 1.7 % — HIGH (ref 0–1.5)
LYMPHOCYTES # BLD AUTO: 2.55 K/UL — SIGNIFICANT CHANGE UP (ref 1–3.3)
LYMPHOCYTES # BLD AUTO: 27 % — SIGNIFICANT CHANGE UP (ref 13–44)
MAGNESIUM SERPL-MCNC: 2.5 MG/DL — SIGNIFICANT CHANGE UP (ref 1.6–2.6)
MCHC RBC-ENTMCNC: 27.6 PG — SIGNIFICANT CHANGE UP (ref 27–34)
MCHC RBC-ENTMCNC: 31.6 GM/DL — LOW (ref 32–36)
MCV RBC AUTO: 87.3 FL — SIGNIFICANT CHANGE UP (ref 80–100)
MONOCYTES # BLD AUTO: 0.66 K/UL — SIGNIFICANT CHANGE UP (ref 0–0.9)
MONOCYTES NFR BLD AUTO: 7 % — SIGNIFICANT CHANGE UP (ref 2–14)
NEUTROPHILS # BLD AUTO: 6.05 K/UL — SIGNIFICANT CHANGE UP (ref 1.8–7.4)
NEUTROPHILS NFR BLD AUTO: 64.1 % — SIGNIFICANT CHANGE UP (ref 43–77)
NRBC # BLD: 0 /100 WBCS — SIGNIFICANT CHANGE UP
NRBC # FLD: 0 K/UL — SIGNIFICANT CHANGE UP
PLATELET # BLD AUTO: 374 K/UL — SIGNIFICANT CHANGE UP (ref 150–400)
POTASSIUM SERPL-MCNC: 4.5 MMOL/L — SIGNIFICANT CHANGE UP (ref 3.5–5.3)
POTASSIUM SERPL-SCNC: 4.5 MMOL/L — SIGNIFICANT CHANGE UP (ref 3.5–5.3)
PROT SERPL-MCNC: 6.6 G/DL — SIGNIFICANT CHANGE UP (ref 6–8.3)
RBC # BLD: 4.64 M/UL — SIGNIFICANT CHANGE UP (ref 3.8–5.2)
RBC # FLD: 12.9 % — SIGNIFICANT CHANGE UP (ref 10.3–14.5)
SODIUM SERPL-SCNC: 143 MMOL/L — SIGNIFICANT CHANGE UP (ref 135–145)
WBC # BLD: 9.44 K/UL — SIGNIFICANT CHANGE UP (ref 3.8–10.5)
WBC # FLD AUTO: 9.44 K/UL — SIGNIFICANT CHANGE UP (ref 3.8–10.5)

## 2021-01-28 PROCEDURE — 99233 SBSQ HOSP IP/OBS HIGH 50: CPT

## 2021-01-28 RX ORDER — SENNA PLUS 8.6 MG/1
2 TABLET ORAL AT BEDTIME
Refills: 0 | Status: DISCONTINUED | OUTPATIENT
Start: 2021-01-28 | End: 2021-01-31

## 2021-01-28 RX ORDER — ALBUTEROL 90 UG/1
2 AEROSOL, METERED ORAL EVERY 6 HOURS
Refills: 0 | Status: DISCONTINUED | OUTPATIENT
Start: 2021-01-28 | End: 2021-01-31

## 2021-01-28 RX ORDER — POLYETHYLENE GLYCOL 3350 17 G/17G
17 POWDER, FOR SOLUTION ORAL DAILY
Refills: 0 | Status: DISCONTINUED | OUTPATIENT
Start: 2021-01-28 | End: 2021-01-31

## 2021-01-28 RX ADMIN — POLYETHYLENE GLYCOL 3350 17 GRAM(S): 17 POWDER, FOR SOLUTION ORAL at 18:33

## 2021-01-28 RX ADMIN — VALSARTAN 160 MILLIGRAM(S): 80 TABLET ORAL at 04:53

## 2021-01-28 RX ADMIN — GABAPENTIN 300 MILLIGRAM(S): 400 CAPSULE ORAL at 17:04

## 2021-01-28 RX ADMIN — Medication 6 MILLIGRAM(S): at 04:53

## 2021-01-28 RX ADMIN — ENOXAPARIN SODIUM 40 MILLIGRAM(S): 100 INJECTION SUBCUTANEOUS at 22:27

## 2021-01-28 RX ADMIN — REMDESIVIR 500 MILLIGRAM(S): 5 INJECTION INTRAVENOUS at 04:53

## 2021-01-28 RX ADMIN — SENNA PLUS 2 TABLET(S): 8.6 TABLET ORAL at 22:27

## 2021-01-28 RX ADMIN — PANTOPRAZOLE SODIUM 40 MILLIGRAM(S): 20 TABLET, DELAYED RELEASE ORAL at 04:53

## 2021-01-28 RX ADMIN — GABAPENTIN 300 MILLIGRAM(S): 400 CAPSULE ORAL at 04:53

## 2021-01-28 RX ADMIN — ENOXAPARIN SODIUM 40 MILLIGRAM(S): 100 INJECTION SUBCUTANEOUS at 09:26

## 2021-01-28 NOTE — PROGRESS NOTE ADULT - ASSESSMENT
55 y.o. Obese Female w/ hx Asthma, ELINOR, HTN, HLD, Type 2 DM, Fibromyalgia, GERD a/w hypoxemia  2/2 COVID-19.  D.C 40 minutes 55 y.o. Obese Female w/ hx Asthma, ELINOR, HTN, HLD, Type 2 DM, Fibromyalgia, GERD a/w hypoxemia  2/2 COVID-19.

## 2021-01-28 NOTE — PROGRESS NOTE ADULT - PROBLEM SELECTOR PLAN 1
Acute hypoxic respiratory failure d/t COVID   Currently on RA; but hypoxic with ambulation and speaking  encouraged incentive spirometry   monitor on continuos pulse oximetry  Management of COVID as below  CXR w/ grossly clear lungs Acute hypoxic respiratory failure d/t COVID   Currently on RA; but hypoxic with ambulation and speaking  encouraged incentive spirometry and chest PT  added albuterol MDI  monitor on continuos pulse oximetry  Management of COVID as below  CXR w/ grossly clear lungs

## 2021-01-28 NOTE — PROGRESS NOTE ADULT - SUBJECTIVE AND OBJECTIVE BOX
Patient is a 55y old  Female who presents with a chief complaint of SOB with gen. weakness; (27 Jan 2021 11:50)      SUBJECTIVE / OVERNIGHT EVENTS:  Patient has no new complaints. Denies cp, SOB, abdominal pain, N/V/D     MEDICATIONS  (STANDING):  dexAMETHasone  Injectable 6 milliGRAM(s) IV Push daily  enoxaparin Injectable 40 milliGRAM(s) SubCutaneous every 12 hours  gabapentin 300 milliGRAM(s) Oral two times a day  pantoprazole    Tablet 40 milliGRAM(s) Oral before breakfast  valsartan 160 milliGRAM(s) Oral daily    MEDICATIONS  (PRN):  acetaminophen   Tablet .. 650 milliGRAM(s) Oral every 4 hours PRN Temp greater or equal to 38.5C (101.3F)  ALBUTerol    90 MICROgram(s) HFA Inhaler 2 Puff(s) Inhalation every 4 hours PRN Shortness of Breath and/or Wheezing  benzonatate 100 milliGRAM(s) Oral every 8 hours PRN Cough  guaiFENesin   Syrup  (Sugar-Free) 100 milliGRAM(s) Oral every 6 hours PRN Cough      Vital Signs Last 24 Hrs  T(C): 36.4 (28 Jan 2021 04:49), Max: 36.7 (27 Jan 2021 22:51)  T(F): 97.5 (28 Jan 2021 04:49), Max: 98.1 (27 Jan 2021 22:51)  HR: 70 (28 Jan 2021 04:49) (65 - 80)  BP: 121/68 (28 Jan 2021 04:49) (119/63 - 123/74)  BP(mean): --  RR: 19 (28 Jan 2021 04:49) (18 - 20)  SpO2: 95% (28 Jan 2021 04:49) (94% - 96%)  CAPILLARY BLOOD GLUCOSE        I&O's Summary      PHYSICAL EXAM:  GENERAL: NAD, well-developed  HEAD:  Atraumatic, Normocephalic  EYES: EOMI, PERRLA, conjunctiva and sclera clear  NECK: Supple, No JVD  CHEST/LUNG: Clear to auscultation bilaterally; No wheeze  HEART: Regular rate and rhythm; No murmurs, rubs, or gallops  ABDOMEN: Soft, Nontender, Nondistended; Bowel sounds present  EXTREMITIES:  2+ Peripheral Pulses, No clubbing, cyanosis, or edema  PSYCH: AAOx3  NEUROLOGY: non-focal  SKIN: No rashes or lesions    LABS:                        12.8   9.44  )-----------( 374      ( 28 Jan 2021 05:20 )             40.5     01-28    143  |  106  |  18  ----------------------------<  88  4.5   |  24  |  0.52    Ca    8.8      28 Jan 2021 05:20  Mg     2.5     01-28    TPro  6.6  /  Alb  3.1<L>  /  TBili  0.3  /  DBili  x   /  AST  20  /  ALT  16  /  AlkPhos  69  01-28              RADIOLOGY & ADDITIONAL TESTS:    Imaging Personally Reviewed:    Consultant(s) Notes Reviewed:      Care Discussed with Consultants/Other Providers:   Patient is a 55y old  Female who presents with a chief complaint of SOB with gen. weakness; (27 Jan 2021 11:50)      SUBJECTIVE / OVERNIGHT EVENTS:  Patient with severe cough and SOB with minimal inhalation or exertion. Denies cp, abdominal pain, N/V/D     MEDICATIONS  (STANDING):  dexAMETHasone  Injectable 6 milliGRAM(s) IV Push daily  enoxaparin Injectable 40 milliGRAM(s) SubCutaneous every 12 hours  gabapentin 300 milliGRAM(s) Oral two times a day  pantoprazole    Tablet 40 milliGRAM(s) Oral before breakfast  valsartan 160 milliGRAM(s) Oral daily    MEDICATIONS  (PRN):  acetaminophen   Tablet .. 650 milliGRAM(s) Oral every 4 hours PRN Temp greater or equal to 38.5C (101.3F)  ALBUTerol    90 MICROgram(s) HFA Inhaler 2 Puff(s) Inhalation every 4 hours PRN Shortness of Breath and/or Wheezing  benzonatate 100 milliGRAM(s) Oral every 8 hours PRN Cough  guaiFENesin   Syrup  (Sugar-Free) 100 milliGRAM(s) Oral every 6 hours PRN Cough      Vital Signs Last 24 Hrs  T(C): 36.4 (28 Jan 2021 04:49), Max: 36.7 (27 Jan 2021 22:51)  T(F): 97.5 (28 Jan 2021 04:49), Max: 98.1 (27 Jan 2021 22:51)  HR: 70 (28 Jan 2021 04:49) (65 - 80)  BP: 121/68 (28 Jan 2021 04:49) (119/63 - 123/74)  BP(mean): --  RR: 19 (28 Jan 2021 04:49) (18 - 20)  SpO2: 95% (28 Jan 2021 04:49) (94% - 96%)  CAPILLARY BLOOD GLUCOSE        I&O's Summary      PHYSICAL EXAM:  GENERAL: NAD, well-developed  HEAD:  Atraumatic, Normocephalic  EYES: EOMI, PERRLA, conjunctiva and sclera clear  NECK: Supple, No JVD  CHEST/LUNG: bibasilar crackles; No wheeze  HEART: Regular rate and rhythm; No murmurs, rubs, or gallops  ABDOMEN: Soft, Nontender, Nondistended; Bowel sounds present  EXTREMITIES:  2+ Peripheral Pulses, No clubbing, cyanosis, or edema  PSYCH: AAOx3  NEUROLOGY: non-focal  SKIN: No rashes or lesions    LABS:                        12.8   9.44  )-----------( 374      ( 28 Jan 2021 05:20 )             40.5     01-28    143  |  106  |  18  ----------------------------<  88  4.5   |  24  |  0.52    Ca    8.8      28 Jan 2021 05:20  Mg     2.5     01-28    TPro  6.6  /  Alb  3.1<L>  /  TBili  0.3  /  DBili  x   /  AST  20  /  ALT  16  /  AlkPhos  69  01-28              RADIOLOGY & ADDITIONAL TESTS:    Imaging Personally Reviewed:    Consultant(s) Notes Reviewed:      Care Discussed with Consultants/Other Providers:

## 2021-01-28 NOTE — PROGRESS NOTE ADULT - PROBLEM SELECTOR PLAN 2
s/p Remdesivir D#5/5  Continue Dexamethasone D#6/10  Procalcitonin 0.04, no indication for antibiotics   trend inflammatory markers q 72 hours  Supplemental O2

## 2021-01-28 NOTE — PROGRESS NOTE ADULT - PROBLEM SELECTOR PLAN 7
DVT - Lovenox SQ, covid-19 protocol  Spoke to daughter on 1/27 and aware of condition DVT - Lovenox SQ, covid-19 protocol  Spoke to daughter on 1/28 and aware of condition

## 2021-01-29 ENCOUNTER — TRANSCRIPTION ENCOUNTER (OUTPATIENT)
Age: 56
End: 2021-01-29

## 2021-01-29 LAB
ANION GAP SERPL CALC-SCNC: 12 MMOL/L — SIGNIFICANT CHANGE UP (ref 7–14)
BUN SERPL-MCNC: 19 MG/DL — SIGNIFICANT CHANGE UP (ref 7–23)
CALCIUM SERPL-MCNC: 9.1 MG/DL — SIGNIFICANT CHANGE UP (ref 8.4–10.5)
CHLORIDE SERPL-SCNC: 104 MMOL/L — SIGNIFICANT CHANGE UP (ref 98–107)
CO2 SERPL-SCNC: 26 MMOL/L — SIGNIFICANT CHANGE UP (ref 22–31)
CREAT SERPL-MCNC: 0.62 MG/DL — SIGNIFICANT CHANGE UP (ref 0.5–1.3)
CRP SERPL-MCNC: <4 MG/L — SIGNIFICANT CHANGE UP
D DIMER BLD IA.RAPID-MCNC: 373 NG/ML DDU — HIGH
FERRITIN SERPL-MCNC: 213 NG/ML — HIGH (ref 15–150)
GLUCOSE SERPL-MCNC: 94 MG/DL — SIGNIFICANT CHANGE UP (ref 70–99)
HCT VFR BLD CALC: 42.3 % — SIGNIFICANT CHANGE UP (ref 34.5–45)
HGB BLD-MCNC: 13 G/DL — SIGNIFICANT CHANGE UP (ref 11.5–15.5)
LDH SERPL L TO P-CCNC: 290 U/L — HIGH (ref 135–225)
MCHC RBC-ENTMCNC: 27.7 PG — SIGNIFICANT CHANGE UP (ref 27–34)
MCHC RBC-ENTMCNC: 30.7 GM/DL — LOW (ref 32–36)
MCV RBC AUTO: 90 FL — SIGNIFICANT CHANGE UP (ref 80–100)
NRBC # BLD: 0 /100 WBCS — SIGNIFICANT CHANGE UP
NRBC # FLD: 0 K/UL — SIGNIFICANT CHANGE UP
PLATELET # BLD AUTO: 410 K/UL — HIGH (ref 150–400)
POTASSIUM SERPL-MCNC: 4.3 MMOL/L — SIGNIFICANT CHANGE UP (ref 3.5–5.3)
POTASSIUM SERPL-SCNC: 4.3 MMOL/L — SIGNIFICANT CHANGE UP (ref 3.5–5.3)
PROCALCITONIN SERPL-MCNC: 0.03 NG/ML — SIGNIFICANT CHANGE UP (ref 0.02–0.1)
RBC # BLD: 4.7 M/UL — SIGNIFICANT CHANGE UP (ref 3.8–5.2)
RBC # FLD: 13 % — SIGNIFICANT CHANGE UP (ref 10.3–14.5)
SODIUM SERPL-SCNC: 142 MMOL/L — SIGNIFICANT CHANGE UP (ref 135–145)
WBC # BLD: 9.01 K/UL — SIGNIFICANT CHANGE UP (ref 3.8–10.5)
WBC # FLD AUTO: 9.01 K/UL — SIGNIFICANT CHANGE UP (ref 3.8–10.5)

## 2021-01-29 PROCEDURE — 99232 SBSQ HOSP IP/OBS MODERATE 35: CPT

## 2021-01-29 RX ADMIN — SENNA PLUS 2 TABLET(S): 8.6 TABLET ORAL at 21:02

## 2021-01-29 RX ADMIN — ALBUTEROL 2 PUFF(S): 90 AEROSOL, METERED ORAL at 17:38

## 2021-01-29 RX ADMIN — GABAPENTIN 300 MILLIGRAM(S): 400 CAPSULE ORAL at 19:38

## 2021-01-29 RX ADMIN — GABAPENTIN 300 MILLIGRAM(S): 400 CAPSULE ORAL at 05:39

## 2021-01-29 RX ADMIN — PANTOPRAZOLE SODIUM 40 MILLIGRAM(S): 20 TABLET, DELAYED RELEASE ORAL at 05:39

## 2021-01-29 RX ADMIN — ENOXAPARIN SODIUM 40 MILLIGRAM(S): 100 INJECTION SUBCUTANEOUS at 21:02

## 2021-01-29 RX ADMIN — VALSARTAN 160 MILLIGRAM(S): 80 TABLET ORAL at 05:39

## 2021-01-29 RX ADMIN — POLYETHYLENE GLYCOL 3350 17 GRAM(S): 17 POWDER, FOR SOLUTION ORAL at 11:01

## 2021-01-29 RX ADMIN — ENOXAPARIN SODIUM 40 MILLIGRAM(S): 100 INJECTION SUBCUTANEOUS at 09:37

## 2021-01-29 RX ADMIN — Medication 6 MILLIGRAM(S): at 05:38

## 2021-01-29 RX ADMIN — ALBUTEROL 2 PUFF(S): 90 AEROSOL, METERED ORAL at 11:02

## 2021-01-29 NOTE — DIETITIAN INITIAL EVALUATION ADULT. - PERTINENT MEDS FT
dexAMETHasone  Injectable  gabapentin  pantoprazole    Tablet  polyethylene glycol 3350  senna  valsartan

## 2021-01-29 NOTE — DISCHARGE NOTE PROVIDER - HOSPITAL COURSE
55 year old Obese F PMHx HTN, HLD, T2DM, Asthma, Fibromyalgia, GERD, ELINOR presenting with c/o fever, shortness of  breath, cough x 1 week admitted for hypoxia 2/2 COVID     Hospital Course:     Hypoxia.    Acute hypoxic respiratory failure d/t COVID   Currently on 3L nc  CXR w/ grossly clear lungs on 1/30 2019 novel coronavirus disease (COVID-19).    Remdesivir (1/23-1/28) /Decadron (1/23-2/2)   Procalcitonin 0.04, no indication for antibiotics   trend inflammatory markers q 72 hours  Supplemental O2.     HTN (hypertension).    monitor blood pressure  On Valsartan- HCTZ QD at home   Will hold HCTZ and continue Valsartan     Type 2 diabetes mellitus without complication  patient reports borderline DM, not on any oral meds/insulin   Hgb A1c 6.3  Monitor glucose while on steroids.     Obstructive sleep apnea.    hx of ELINOR  CPAP QHS    EKG abnormality.   EKG - TWI lead III, avF, V3, suspect chronic changes   patient denies any complaints of chest pain   Hs trop 10   TTE to be done outpatient     Dispo: Patient is medically cleared for discharge 1/31 as per Dr. Leong. Medications have been discussed with patient and  sent to patients pharmacy.

## 2021-01-29 NOTE — PROGRESS NOTE ADULT - SUBJECTIVE AND OBJECTIVE BOX
Patient is a 55y old  Female who presents with a chief complaint of SOB with gen. weakness; (28 Jan 2021 10:48)      SUBJECTIVE / OVERNIGHT EVENTS:    MEDICATIONS  (STANDING):  dexAMETHasone  Injectable 6 milliGRAM(s) IV Push daily  enoxaparin Injectable 40 milliGRAM(s) SubCutaneous every 12 hours  gabapentin 300 milliGRAM(s) Oral two times a day  pantoprazole    Tablet 40 milliGRAM(s) Oral before breakfast  polyethylene glycol 3350 17 Gram(s) Oral daily  senna 2 Tablet(s) Oral at bedtime  valsartan 160 milliGRAM(s) Oral daily    MEDICATIONS  (PRN):  acetaminophen   Tablet .. 650 milliGRAM(s) Oral every 4 hours PRN Temp greater or equal to 38.5C (101.3F)  ALBUTerol    90 MICROgram(s) HFA Inhaler 2 Puff(s) Inhalation every 6 hours PRN Shortness of Breath  benzonatate 100 milliGRAM(s) Oral every 8 hours PRN Cough  guaiFENesin   Syrup  (Sugar-Free) 100 milliGRAM(s) Oral every 6 hours PRN Cough      Vital Signs Last 24 Hrs  T(C): 36.9 (29 Jan 2021 09:32), Max: 36.9 (29 Jan 2021 09:32)  T(F): 98.5 (29 Jan 2021 09:32), Max: 98.5 (29 Jan 2021 09:32)  HR: 80 (29 Jan 2021 09:32) (71 - 87)  BP: 103/56 (29 Jan 2021 09:32) (103/56 - 132/75)  BP(mean): --  RR: 18 (29 Jan 2021 09:32) (16 - 18)  SpO2: 97% (29 Jan 2021 09:32) (95% - 100%)  CAPILLARY BLOOD GLUCOSE        I&O's Summary    28 Jan 2021 07:01  -  29 Jan 2021 07:00  --------------------------------------------------------  IN: 480 mL / OUT: 400 mL / NET: 80 mL        PHYSICAL EXAM:  GENERAL: NAD, well-developed  HEAD:  Atraumatic, Normocephalic  EYES: EOMI, PERRLA, conjunctiva and sclera clear  NECK: Supple, No JVD  CHEST/LUNG: Clear to auscultation bilaterally; No wheeze  HEART: Regular rate and rhythm; No murmurs, rubs, or gallops  ABDOMEN: Soft, Nontender, Nondistended; Bowel sounds present  EXTREMITIES:  2+ Peripheral Pulses, No clubbing, cyanosis, or edema  PSYCH: AAOx3  NEUROLOGY: non-focal  SKIN: No rashes or lesions    LABS:                        12.8   9.44  )-----------( 374      ( 28 Jan 2021 05:20 )             40.5     01-29    142  |  104  |  19  ----------------------------<  94  4.3   |  26  |  0.62    Ca    9.1      29 Jan 2021 07:21  Mg     2.5     01-28    TPro  6.6  /  Alb  3.1<L>  /  TBili  0.3  /  DBili  x   /  AST  20  /  ALT  16  /  AlkPhos  69  01-28              RADIOLOGY & ADDITIONAL TESTS:    Imaging Personally Reviewed:    Consultant(s) Notes Reviewed:      Care Discussed with Consultants/Other Providers:   Patient is a 55y old  Female who presents with a chief complaint of SOB with gen. weakness; (28 Jan 2021 10:48)      SUBJECTIVE / OVERNIGHT EVENTS:  Patient has no new complaints. Still can't bring up secretions. didn't get albuterol MDI yesterday. Denies cp, abdominal pain, N/V/D     MEDICATIONS  (STANDING):  dexAMETHasone  Injectable 6 milliGRAM(s) IV Push daily  enoxaparin Injectable 40 milliGRAM(s) SubCutaneous every 12 hours  gabapentin 300 milliGRAM(s) Oral two times a day  pantoprazole    Tablet 40 milliGRAM(s) Oral before breakfast  polyethylene glycol 3350 17 Gram(s) Oral daily  senna 2 Tablet(s) Oral at bedtime  valsartan 160 milliGRAM(s) Oral daily    MEDICATIONS  (PRN):  acetaminophen   Tablet .. 650 milliGRAM(s) Oral every 4 hours PRN Temp greater or equal to 38.5C (101.3F)  ALBUTerol    90 MICROgram(s) HFA Inhaler 2 Puff(s) Inhalation every 6 hours PRN Shortness of Breath  benzonatate 100 milliGRAM(s) Oral every 8 hours PRN Cough  guaiFENesin   Syrup  (Sugar-Free) 100 milliGRAM(s) Oral every 6 hours PRN Cough      Vital Signs Last 24 Hrs  T(C): 36.9 (29 Jan 2021 09:32), Max: 36.9 (29 Jan 2021 09:32)  T(F): 98.5 (29 Jan 2021 09:32), Max: 98.5 (29 Jan 2021 09:32)  HR: 80 (29 Jan 2021 09:32) (71 - 87)  BP: 103/56 (29 Jan 2021 09:32) (103/56 - 132/75)  BP(mean): --  RR: 18 (29 Jan 2021 09:32) (16 - 18)  SpO2: 97% (29 Jan 2021 09:32) (95% - 100%)  CAPILLARY BLOOD GLUCOSE        I&O's Summary    28 Jan 2021 07:01  -  29 Jan 2021 07:00  --------------------------------------------------------  IN: 480 mL / OUT: 400 mL / NET: 80 mL        PHYSICAL EXAM:  GENERAL: NAD, well-developed  HEAD:  Atraumatic, Normocephalic  EYES: EOMI, PERRLA, conjunctiva and sclera clear  NECK: Supple, No JVD  CHEST/LUNG: mild congestion bilaterally; No wheeze  HEART: Regular rate and rhythm; No murmurs, rubs, or gallops  ABDOMEN: Soft, Nontender, Nondistended; Bowel sounds present  EXTREMITIES:  2+ Peripheral Pulses, No clubbing, cyanosis, or edema  PSYCH: AAOx3  NEUROLOGY: non-focal  SKIN: No rashes or lesions    LABS:                        12.8   9.44  )-----------( 374      ( 28 Jan 2021 05:20 )             40.5     01-29    142  |  104  |  19  ----------------------------<  94  4.3   |  26  |  0.62    Ca    9.1      29 Jan 2021 07:21  Mg     2.5     01-28    TPro  6.6  /  Alb  3.1<L>  /  TBili  0.3  /  DBili  x   /  AST  20  /  ALT  16  /  AlkPhos  69  01-28              RADIOLOGY & ADDITIONAL TESTS:    Imaging Personally Reviewed:    Consultant(s) Notes Reviewed:      Care Discussed with Consultants/Other Providers:

## 2021-01-29 NOTE — DIETITIAN INITIAL EVALUATION ADULT. - SIGNS/SYMPTOMS
As evidenced by elevated blood glucose, HbA1c As evidenced by insufficient po intake PTA, altered taste

## 2021-01-29 NOTE — DIETITIAN INITIAL EVALUATION ADULT. - PROBLEM SELECTOR PLAN 1
Sp02 95% on 4L NC;   monitor on continuos pulse oximetry  Start Remdesivir x 5 days  patient is s/p IV Solumedrol 125mg x1 in ER   Continue Dexamethasone x 10 days  Tessalon perles PRN Cough

## 2021-01-29 NOTE — PROGRESS NOTE ADULT - PROBLEM SELECTOR PLAN 1
Acute hypoxic respiratory failure d/t COVID   Currently on RA; but hypoxic with ambulation and speaking  encouraged incentive spirometry and chest PT  added albuterol MDI  monitor on continuos pulse oximetry  Management of COVID as below  CXR w/ grossly clear lungs Acute hypoxic respiratory failure d/t COVID   Currently on RA; but hypoxic with ambulation and speaking  encouraged incentive spirometry and chest PT  c/w albuterol MDI  monitor on continuos pulse oximetry  Management of COVID as below  CXR w/ grossly clear lungs

## 2021-01-29 NOTE — DIETITIAN INITIAL EVALUATION ADULT. - PERTINENT LABORATORY DATA
01-29 Na142 mmol/L Glu 94 mg/dL K+ 4.3 mmol/L Cr  0.62 mg/dL BUN 19 mg/dL 01-25 Phos 4.0 mg/dL 01-28 Alb 3.1 g/dL<L> 01-24 Chol 151 mg/dL LDL --    HDL 47 mg/dL<L> Trig 102 mg/dL    A1C with Estimated Average Glucose (01.24.21 @ 07:10)    A1C with Estimated Average Glucose Result: 6.3:     Estimated Average Glucose: 134 mg/dL

## 2021-01-29 NOTE — DISCHARGE NOTE PROVIDER - NSDCCPCAREPLAN_GEN_ALL_CORE_FT
PRINCIPAL DISCHARGE DIAGNOSIS  Diagnosis: COVID-19  Assessment and Plan of Treatment: You have been diagnosed with the COVID-19 virus during your hospital stay. You must self quarantine to complete a 10 day time period.  Monitor for fevers, shortness of breath and cough primarily.  Monitor your temperature daily to not any changes and increases.    It has been determined that you no longer need hospitalization and can recover while remaining in self-quarantine at home. You should follow the prevention steps below until a healthcare provider or local or state health department says you can return to your normal activities.  1. You should restrict activities outside your home, except for getting medical care.  2. Do not go to work, school, or public areas.  3. Avoid using public transportation, ride-sharing, or taxis.  4. Separate yourself from other people and animals in your home.  5. Call ahead before visiting your doctor.  6. Wear a facemask.  7. Cover your coughs and sneezes.  8. Clean your hands often.  9. Avoid sharing personal household items.  10. Clean all “high-touch” surfaces everyday.  11. Monitor your symptoms.  If you have a medical emergency and need to call 911, notify the dispatch personnel that you have COVID-19 If possible, put on a facemask before emergency medical services arrive.  12. Stopping home isolation.  Patients with confirmed COVID-19 should remain under home isolation precautions for 14 days since the positive COVID-19 test and until the risk of secondary transmission to others is thought to be low. The decision to discontinue home isolation precautions should be made on a case-by-case basis, in consultation with healthcare providers and state and local health departments. Your [facility] Department of Health can be reached at [phone] for further information about COVID-19.        SECONDARY DISCHARGE DIAGNOSES  Diagnosis: HTN (hypertension)  Assessment and Plan of Treatment: Continue blood pressure medication regimen as directed. Monitor for any visual changes, headaches or dizziness.  Monitor blood pressure regularly.  Follow up with your PCP for further management for high blood pressure. Please have echocardiogram done outpatient.      Diagnosis: Type 2 diabetes mellitus without complication, without long-term current use of insulin  Assessment and Plan of Treatment: Continue consistent carbohydrate diet.  Monitor blood glucose levels throughout the day before meals and at bedtime.  Record blood sugars and bring to outpatient providers appointment in order to be reviewed by your doctor for management modifications.  Be aware of diabetes management symptoms including feeling cool and clammy may be related to low glucose levels.  Feeling hot and dry may indicate high glucose levels.  If  you feel these symptoms, check your blood sugar.  Make regular podiatry appointments in order to have feet checked for wounds and toe nails cut by a doctor to prevent infections.      Diagnosis: EKG abnormality  Assessment and Plan of Treatment: Please have echocardiogram done outpatient with your PCP.    Diagnosis: Obstructive sleep apnea  Assessment and Plan of Treatment: Continue with home cpap at night as recommended by your doctor.

## 2021-01-29 NOTE — DIETITIAN INITIAL EVALUATION ADULT. - PROBLEM SELECTOR PLAN 7
EKG - TWI lead III, avF, V3, suspect chronic not acute  patient denies any complaints of chest pain   add on Trop to BMP   repeat Trop and EKG in Am  TTE in AM  No need for Telemetry

## 2021-01-29 NOTE — DIETITIAN INITIAL EVALUATION ADULT. - PROBLEM SELECTOR PLAN 2
CXR- clear lungs, my reading  Start Remdesivir, Dexamethasone  Procalcitonin 0.04 - will hold off on antibiotics   trend inflammatory markers q 72 hours  Hold HCTZ and SSRI, concerned for hyponatremia in the setting of covid-19

## 2021-01-29 NOTE — DIETITIAN INITIAL EVALUATION ADULT. - ORAL NUTRITION SUPPLEMENTS
Recommend Glucerna Therapeutic Nutrition 240mls 2x daily (440kcals, 20g protein) for added calories and protein.

## 2021-01-29 NOTE — PROGRESS NOTE ADULT - PROBLEM SELECTOR PLAN 2
s/p Remdesivir D#5/5  Continue Dexamethasone D#7/10  Procalcitonin 0.04, no indication for antibiotics   trend inflammatory markers q 72 hours  Supplemental O2

## 2021-01-29 NOTE — DISCHARGE NOTE PROVIDER - NSDCMRMEDTOKEN_GEN_ALL_CORE_FT
Albuterol (Eqv-ProAir HFA) 90 mcg/inh inhalation aerosol: 2 puff(s) inhaled every 6 hours  DULoxetine 30 mg oral delayed release capsule: 1 tab(s) orally once a day, As Needed  gabapentin 600 mg/24 hours oral tablet, extended release: 1 tab(s) orally once a day  omeprazole 40 mg oral delayed release capsule: 1 cap(s) orally once a day  Qvar Redihaler 80 mcg/inh inhalation aerosol: 1 puff(s) inhaled 2 times a day  valsartan-hydrochlorothiazide 160mg-12.5mg oral tablet: 1 tab(s) orally once a day   acetaminophen 325 mg oral tablet: 2 tab(s) orally every 4 hours, As needed, Temp greater or equal to 38.5C (101.3F)  Albuterol (Eqv-ProAir HFA) 90 mcg/inh inhalation aerosol: 2 puff(s) inhaled every 6 hours  benzonatate 100 mg oral capsule: 1 cap(s) orally every 8 hours, As needed, Cough  DULoxetine 30 mg oral delayed release capsule: 1 tab(s) orally once a day, As Needed  gabapentin 600 mg/24 hours oral tablet, extended release: 1 tab(s) orally once a day  guaiFENesin 100 mg/5 mL oral liquid: 5 milliliter(s) orally every 6 hours, As needed, Cough  omeprazole 40 mg oral delayed release capsule: 1 cap(s) orally once a day  polyethylene glycol 3350 oral powder for reconstitution: 17 gram(s) orally once a day as needed for constipation   Qvar Redihaler 80 mcg/inh inhalation aerosol: 1 puff(s) inhaled 2 times a day  valsartan-hydrochlorothiazide 160mg-12.5mg oral tablet: 1 tab(s) orally once a day  Xarelto 10 mg oral tablet: 1 tab(s) orally once a day

## 2021-01-29 NOTE — DISCHARGE NOTE PROVIDER - NSFOLLOWUPCLINICS_GEN_ALL_ED_FT
[facility] Pulmonolgy and Sleep Medicine  Pulmonology  [address] [place] [address]  Phone: [phone]  Fax:   Follow Up Time:

## 2021-01-29 NOTE — DIETITIAN INITIAL EVALUATION ADULT. - CHIEF COMPLAINT
56 y/o Female with medical history of obesity, HTN, HLD, T2DM (HbA1c 6.3% 1/24/2021), asthma, fibromyalgia, GERD, presenting with c/o fever, SOB, cough x 1 week admitted for hypoxia 2/2 COVID positive.

## 2021-01-29 NOTE — DIETITIAN INITIAL EVALUATION ADULT. - OTHER INFO
Unable to conduct a face to face interview or nutrition-focused physical exam due to limited contact restrictions related to Pt's medical condition and isolation precautions. Collateral information obtained from extensive chart review and spoke to patient ([phone]). Patient reports appetite improved and consuming atleast 50% of meals. Patient w/ c/o of constipation-pt on bowel regimen. Otherwise, denies any nausea/vomiting or difficulty chewing and swallowing. Allergic to orange. No other food allergies reported. Food preferences voiced and implemented. Therapeutic diet recommendations provided.

## 2021-01-30 LAB
ANION GAP SERPL CALC-SCNC: 14 MMOL/L — SIGNIFICANT CHANGE UP (ref 7–14)
BUN SERPL-MCNC: 19 MG/DL — SIGNIFICANT CHANGE UP (ref 7–23)
CALCIUM SERPL-MCNC: 9.5 MG/DL — SIGNIFICANT CHANGE UP (ref 8.4–10.5)
CHLORIDE SERPL-SCNC: 103 MMOL/L — SIGNIFICANT CHANGE UP (ref 98–107)
CO2 SERPL-SCNC: 21 MMOL/L — LOW (ref 22–31)
CREAT SERPL-MCNC: 0.54 MG/DL — SIGNIFICANT CHANGE UP (ref 0.5–1.3)
GLUCOSE SERPL-MCNC: 113 MG/DL — HIGH (ref 70–99)
HCT VFR BLD CALC: 39.3 % — SIGNIFICANT CHANGE UP (ref 34.5–45)
HGB BLD-MCNC: 12.8 G/DL — SIGNIFICANT CHANGE UP (ref 11.5–15.5)
MCHC RBC-ENTMCNC: 27.8 PG — SIGNIFICANT CHANGE UP (ref 27–34)
MCHC RBC-ENTMCNC: 32.6 GM/DL — SIGNIFICANT CHANGE UP (ref 32–36)
MCV RBC AUTO: 85.2 FL — SIGNIFICANT CHANGE UP (ref 80–100)
NRBC # BLD: 0 /100 WBCS — SIGNIFICANT CHANGE UP
NRBC # FLD: 0 K/UL — SIGNIFICANT CHANGE UP
PLATELET # BLD AUTO: 420 K/UL — HIGH (ref 150–400)
POTASSIUM SERPL-MCNC: 4.6 MMOL/L — SIGNIFICANT CHANGE UP (ref 3.5–5.3)
POTASSIUM SERPL-SCNC: 4.6 MMOL/L — SIGNIFICANT CHANGE UP (ref 3.5–5.3)
RBC # BLD: 4.61 M/UL — SIGNIFICANT CHANGE UP (ref 3.8–5.2)
RBC # FLD: 13.3 % — SIGNIFICANT CHANGE UP (ref 10.3–14.5)
SODIUM SERPL-SCNC: 138 MMOL/L — SIGNIFICANT CHANGE UP (ref 135–145)
WBC # BLD: 10.71 K/UL — HIGH (ref 3.8–10.5)
WBC # FLD AUTO: 10.71 K/UL — HIGH (ref 3.8–10.5)

## 2021-01-30 PROCEDURE — 71045 X-RAY EXAM CHEST 1 VIEW: CPT | Mod: 26

## 2021-01-30 PROCEDURE — 99232 SBSQ HOSP IP/OBS MODERATE 35: CPT

## 2021-01-30 RX ADMIN — VALSARTAN 160 MILLIGRAM(S): 80 TABLET ORAL at 04:34

## 2021-01-30 RX ADMIN — ALBUTEROL 2 PUFF(S): 90 AEROSOL, METERED ORAL at 04:43

## 2021-01-30 RX ADMIN — GABAPENTIN 300 MILLIGRAM(S): 400 CAPSULE ORAL at 04:33

## 2021-01-30 RX ADMIN — Medication 6 MILLIGRAM(S): at 04:33

## 2021-01-30 RX ADMIN — POLYETHYLENE GLYCOL 3350 17 GRAM(S): 17 POWDER, FOR SOLUTION ORAL at 12:33

## 2021-01-30 RX ADMIN — ALBUTEROL 2 PUFF(S): 90 AEROSOL, METERED ORAL at 21:08

## 2021-01-30 RX ADMIN — GABAPENTIN 300 MILLIGRAM(S): 400 CAPSULE ORAL at 18:01

## 2021-01-30 RX ADMIN — ENOXAPARIN SODIUM 40 MILLIGRAM(S): 100 INJECTION SUBCUTANEOUS at 10:25

## 2021-01-30 RX ADMIN — ENOXAPARIN SODIUM 40 MILLIGRAM(S): 100 INJECTION SUBCUTANEOUS at 21:06

## 2021-01-30 RX ADMIN — SENNA PLUS 2 TABLET(S): 8.6 TABLET ORAL at 21:06

## 2021-01-30 RX ADMIN — PANTOPRAZOLE SODIUM 40 MILLIGRAM(S): 20 TABLET, DELAYED RELEASE ORAL at 04:33

## 2021-01-30 NOTE — PROGRESS NOTE ADULT - SUBJECTIVE AND OBJECTIVE BOX
Patient is a 55y old  Female who presents with a chief complaint of SOB with gen. weakness; (29 Jan 2021 11:59)      SUBJECTIVE / OVERNIGHT EVENTS:    MEDICATIONS  (STANDING):  dexAMETHasone  Injectable 6 milliGRAM(s) IV Push daily  enoxaparin Injectable 40 milliGRAM(s) SubCutaneous every 12 hours  gabapentin 300 milliGRAM(s) Oral two times a day  pantoprazole    Tablet 40 milliGRAM(s) Oral before breakfast  polyethylene glycol 3350 17 Gram(s) Oral daily  senna 2 Tablet(s) Oral at bedtime  valsartan 160 milliGRAM(s) Oral daily    MEDICATIONS  (PRN):  acetaminophen   Tablet .. 650 milliGRAM(s) Oral every 4 hours PRN Temp greater or equal to 38.5C (101.3F)  ALBUTerol    90 MICROgram(s) HFA Inhaler 2 Puff(s) Inhalation every 6 hours PRN Shortness of Breath  benzonatate 100 milliGRAM(s) Oral every 8 hours PRN Cough  guaiFENesin   Syrup  (Sugar-Free) 100 milliGRAM(s) Oral every 6 hours PRN Cough      Vital Signs Last 24 Hrs  T(C): 36.6 (30 Jan 2021 04:30), Max: 36.8 (29 Jan 2021 20:57)  T(F): 97.9 (30 Jan 2021 04:30), Max: 98.2 (29 Jan 2021 20:57)  HR: 65 (30 Jan 2021 04:30) (65 - 78)  BP: 119/65 (30 Jan 2021 04:30) (107/56 - 144/89)  BP(mean): --  RR: 18 (30 Jan 2021 04:30) (17 - 18)  SpO2: 96% (30 Jan 2021 04:30) (95% - 98%)  CAPILLARY BLOOD GLUCOSE        I&O's Summary    29 Jan 2021 07:01  -  30 Jan 2021 07:00  --------------------------------------------------------  IN: 0 mL / OUT: 350 mL / NET: -350 mL        PHYSICAL EXAM:  GENERAL: NAD, well-developed  HEAD:  Atraumatic, Normocephalic  EYES: EOMI, PERRLA, conjunctiva and sclera clear  NECK: Supple, No JVD  CHEST/LUNG: Clear to auscultation bilaterally; No wheeze  HEART: Regular rate and rhythm; No murmurs, rubs, or gallops  ABDOMEN: Soft, Nontender, Nondistended; Bowel sounds present  EXTREMITIES:  2+ Peripheral Pulses, No clubbing, cyanosis, or edema  PSYCH: AAOx3  NEUROLOGY: non-focal  SKIN: No rashes or lesions    LABS:                        12.8   10.71 )-----------( 420      ( 30 Jan 2021 08:16 )             39.3     01-30    138  |  103  |  19  ----------------------------<  113<H>  4.6   |  21<L>  |  0.54    Ca    9.5      30 Jan 2021 08:13                RADIOLOGY & ADDITIONAL TESTS:    Imaging Personally Reviewed:    Consultant(s) Notes Reviewed:      Care Discussed with Consultants/Other Providers:   Patient is a 55y old  Female who presents with a chief complaint of SOB with gen. weakness; (29 Jan 2021 11:59)      SUBJECTIVE / OVERNIGHT EVENTS:  Patient unable to bring up airway secretions.  Still with CRAVEN. Denies cp, abdominal pain, N/V/D     MEDICATIONS  (STANDING):  dexAMETHasone  Injectable 6 milliGRAM(s) IV Push daily  enoxaparin Injectable 40 milliGRAM(s) SubCutaneous every 12 hours  gabapentin 300 milliGRAM(s) Oral two times a day  pantoprazole    Tablet 40 milliGRAM(s) Oral before breakfast  polyethylene glycol 3350 17 Gram(s) Oral daily  senna 2 Tablet(s) Oral at bedtime  valsartan 160 milliGRAM(s) Oral daily    MEDICATIONS  (PRN):  acetaminophen   Tablet .. 650 milliGRAM(s) Oral every 4 hours PRN Temp greater or equal to 38.5C (101.3F)  ALBUTerol    90 MICROgram(s) HFA Inhaler 2 Puff(s) Inhalation every 6 hours PRN Shortness of Breath  benzonatate 100 milliGRAM(s) Oral every 8 hours PRN Cough  guaiFENesin   Syrup  (Sugar-Free) 100 milliGRAM(s) Oral every 6 hours PRN Cough      Vital Signs Last 24 Hrs  T(C): 36.6 (30 Jan 2021 04:30), Max: 36.8 (29 Jan 2021 20:57)  T(F): 97.9 (30 Jan 2021 04:30), Max: 98.2 (29 Jan 2021 20:57)  HR: 65 (30 Jan 2021 04:30) (65 - 78)  BP: 119/65 (30 Jan 2021 04:30) (107/56 - 144/89)  BP(mean): --  RR: 18 (30 Jan 2021 04:30) (17 - 18)  SpO2: 96% (30 Jan 2021 04:30) (95% - 98%)  CAPILLARY BLOOD GLUCOSE        I&O's Summary    29 Jan 2021 07:01  -  30 Jan 2021 07:00  --------------------------------------------------------  IN: 0 mL / OUT: 350 mL / NET: -350 mL        PHYSICAL EXAM:  GENERAL: NAD, well-developed  HEAD:  Atraumatic, Normocephalic  EYES: EOMI, PERRLA, conjunctiva and sclera clear  NECK: Supple, No JVD  CHEST/LUNG: decreased BS bilaterally; No wheeze  HEART: Regular rate and rhythm; No murmurs, rubs, or gallops  ABDOMEN: Soft, Nontender, Nondistended; Bowel sounds present  EXTREMITIES:  2+ Peripheral Pulses, No clubbing, cyanosis, or edema  PSYCH: AAOx3  NEUROLOGY: non-focal  SKIN: No rashes or lesions    LABS:                        12.8   10.71 )-----------( 420      ( 30 Jan 2021 08:16 )             39.3     01-30    138  |  103  |  19  ----------------------------<  113<H>  4.6   |  21<L>  |  0.54    Ca    9.5      30 Jan 2021 08:13                RADIOLOGY & ADDITIONAL TESTS:    Imaging Personally Reviewed:    Consultant(s) Notes Reviewed:      Care Discussed with Consultants/Other Providers:

## 2021-01-30 NOTE — PROGRESS NOTE ADULT - PROBLEM SELECTOR PLAN 1
Acute hypoxic respiratory failure d/t COVID   Currently on RA; but hypoxic with ambulation and speaking  encouraged incentive spirometry and chest PT  c/w albuterol MDI  monitor on continuos pulse oximetry  Management of COVID as below  CXR w/ grossly clear lungs Acute hypoxic respiratory failure d/t COVID   Currently on RA; but hypoxic with ambulation and speaking  encouraged incentive spirometry and chest PT  c/w albuterol MDI  monitor on continuos pulse oximetry  Management of COVID as below  repeat CXR today

## 2021-01-30 NOTE — PROGRESS NOTE ADULT - PROBLEM SELECTOR PLAN 2
s/p Remdesivir D#5/5  Continue Dexamethasone D#8/10  Procalcitonin 0.04, no indication for antibiotics   trend inflammatory markers q 72 hours  Supplemental O2 s/p Remdesivir D#5/5  Continue Dexamethasone D#8/10  Procalcitonin 0.04, no indication for antibiotics   trend inflammatory markers q 72 hours  now on RA but still with CRAVEN; repeat CXR.

## 2021-01-31 ENCOUNTER — TRANSCRIPTION ENCOUNTER (OUTPATIENT)
Age: 56
End: 2021-01-31

## 2021-01-31 VITALS — OXYGEN SATURATION: 96 %

## 2021-01-31 PROBLEM — Z87.19 PERSONAL HISTORY OF OTHER DISEASES OF THE DIGESTIVE SYSTEM: Chronic | Status: ACTIVE | Noted: 2021-01-23

## 2021-01-31 PROBLEM — E78.5 HYPERLIPIDEMIA, UNSPECIFIED: Chronic | Status: ACTIVE | Noted: 2021-01-23

## 2021-01-31 PROBLEM — J45.909 UNSPECIFIED ASTHMA, UNCOMPLICATED: Chronic | Status: ACTIVE | Noted: 2021-01-23

## 2021-01-31 PROBLEM — I10 ESSENTIAL (PRIMARY) HYPERTENSION: Chronic | Status: ACTIVE | Noted: 2021-01-23

## 2021-01-31 PROBLEM — G47.33 OBSTRUCTIVE SLEEP APNEA (ADULT) (PEDIATRIC): Chronic | Status: ACTIVE | Noted: 2021-01-23

## 2021-01-31 PROBLEM — E11.9 TYPE 2 DIABETES MELLITUS WITHOUT COMPLICATIONS: Chronic | Status: ACTIVE | Noted: 2021-01-23

## 2021-01-31 PROBLEM — M79.7 FIBROMYALGIA: Chronic | Status: ACTIVE | Noted: 2021-01-23

## 2021-01-31 LAB
ANION GAP SERPL CALC-SCNC: 11 MMOL/L — SIGNIFICANT CHANGE UP (ref 7–14)
BUN SERPL-MCNC: 22 MG/DL — SIGNIFICANT CHANGE UP (ref 7–23)
CALCIUM SERPL-MCNC: 9.4 MG/DL — SIGNIFICANT CHANGE UP (ref 8.4–10.5)
CHLORIDE SERPL-SCNC: 99 MMOL/L — SIGNIFICANT CHANGE UP (ref 98–107)
CO2 SERPL-SCNC: 26 MMOL/L — SIGNIFICANT CHANGE UP (ref 22–31)
CREAT SERPL-MCNC: 0.6 MG/DL — SIGNIFICANT CHANGE UP (ref 0.5–1.3)
GLUCOSE SERPL-MCNC: 91 MG/DL — SIGNIFICANT CHANGE UP (ref 70–99)
HCT VFR BLD CALC: 39.8 % — SIGNIFICANT CHANGE UP (ref 34.5–45)
HGB BLD-MCNC: 12.6 G/DL — SIGNIFICANT CHANGE UP (ref 11.5–15.5)
MCHC RBC-ENTMCNC: 27.8 PG — SIGNIFICANT CHANGE UP (ref 27–34)
MCHC RBC-ENTMCNC: 31.7 GM/DL — LOW (ref 32–36)
MCV RBC AUTO: 87.7 FL — SIGNIFICANT CHANGE UP (ref 80–100)
NRBC # BLD: 0 /100 WBCS — SIGNIFICANT CHANGE UP
NRBC # FLD: 0 K/UL — SIGNIFICANT CHANGE UP
PLATELET # BLD AUTO: 420 K/UL — HIGH (ref 150–400)
POTASSIUM SERPL-MCNC: 4.4 MMOL/L — SIGNIFICANT CHANGE UP (ref 3.5–5.3)
POTASSIUM SERPL-SCNC: 4.4 MMOL/L — SIGNIFICANT CHANGE UP (ref 3.5–5.3)
RBC # BLD: 4.54 M/UL — SIGNIFICANT CHANGE UP (ref 3.8–5.2)
RBC # FLD: 13.4 % — SIGNIFICANT CHANGE UP (ref 10.3–14.5)
SODIUM SERPL-SCNC: 136 MMOL/L — SIGNIFICANT CHANGE UP (ref 135–145)
WBC # BLD: 9.44 K/UL — SIGNIFICANT CHANGE UP (ref 3.8–10.5)
WBC # FLD AUTO: 9.44 K/UL — SIGNIFICANT CHANGE UP (ref 3.8–10.5)

## 2021-01-31 PROCEDURE — 99239 HOSP IP/OBS DSCHRG MGMT >30: CPT

## 2021-01-31 RX ORDER — ACETAMINOPHEN 500 MG
2 TABLET ORAL
Qty: 0 | Refills: 0 | DISCHARGE
Start: 2021-01-31

## 2021-01-31 RX ORDER — POLYETHYLENE GLYCOL 3350 17 G/17G
17 POWDER, FOR SOLUTION ORAL
Qty: 0 | Refills: 0 | DISCHARGE
Start: 2021-01-31

## 2021-01-31 RX ORDER — ENOXAPARIN SODIUM 100 MG/ML
40 INJECTION SUBCUTANEOUS ONCE
Refills: 0 | Status: COMPLETED | OUTPATIENT
Start: 2021-01-31 | End: 2021-01-31

## 2021-01-31 RX ORDER — RIVAROXABAN 15 MG-20MG
10 KIT ORAL ONCE
Refills: 0 | Status: DISCONTINUED | OUTPATIENT
Start: 2021-02-01 | End: 2021-01-31

## 2021-01-31 RX ORDER — RIVAROXABAN 15 MG-20MG
1 KIT ORAL
Qty: 30 | Refills: 0
Start: 2021-01-31 | End: 2021-03-01

## 2021-01-31 RX ADMIN — GABAPENTIN 300 MILLIGRAM(S): 400 CAPSULE ORAL at 17:10

## 2021-01-31 RX ADMIN — ENOXAPARIN SODIUM 40 MILLIGRAM(S): 100 INJECTION SUBCUTANEOUS at 09:40

## 2021-01-31 RX ADMIN — ALBUTEROL 2 PUFF(S): 90 AEROSOL, METERED ORAL at 05:19

## 2021-01-31 RX ADMIN — GABAPENTIN 300 MILLIGRAM(S): 400 CAPSULE ORAL at 05:06

## 2021-01-31 RX ADMIN — POLYETHYLENE GLYCOL 3350 17 GRAM(S): 17 POWDER, FOR SOLUTION ORAL at 09:40

## 2021-01-31 RX ADMIN — PANTOPRAZOLE SODIUM 40 MILLIGRAM(S): 20 TABLET, DELAYED RELEASE ORAL at 05:06

## 2021-01-31 RX ADMIN — Medication 6 MILLIGRAM(S): at 05:06

## 2021-01-31 RX ADMIN — ENOXAPARIN SODIUM 40 MILLIGRAM(S): 100 INJECTION SUBCUTANEOUS at 20:56

## 2021-01-31 NOTE — PROGRESS NOTE ADULT - PROBLEM SELECTOR PLAN 7
DVT - Lovenox SQ, covid-19 protocol  Spoke to daughter on 1/29 and aware of condition DVT - Lovenox SQ, covid-19 protocol  Spoke to daughter on 1/31 and aware of condition

## 2021-01-31 NOTE — PROGRESS NOTE ADULT - PROBLEM SELECTOR PLAN 6
EKG - TWI lead III, avF, V3, suspect chronic changes   patient denies any complaints of chest pain   Hs trop 10   check TTE EKG - TWI lead III, avF, V3, suspect chronic changes   patient denies any complaints of chest pain   Hs trop 10

## 2021-01-31 NOTE — PROGRESS NOTE ADULT - SUBJECTIVE AND OBJECTIVE BOX
Patient is a 55y old  Female who presents with a chief complaint of SOB with gen. weakness; (30 Jan 2021 09:34)      SUBJECTIVE / OVERNIGHT EVENTS:    MEDICATIONS  (STANDING):  dexAMETHasone  Injectable 6 milliGRAM(s) IV Push daily  enoxaparin Injectable 40 milliGRAM(s) SubCutaneous every 12 hours  gabapentin 300 milliGRAM(s) Oral two times a day  pantoprazole    Tablet 40 milliGRAM(s) Oral before breakfast  polyethylene glycol 3350 17 Gram(s) Oral daily  senna 2 Tablet(s) Oral at bedtime  valsartan 160 milliGRAM(s) Oral daily    MEDICATIONS  (PRN):  acetaminophen   Tablet .. 650 milliGRAM(s) Oral every 4 hours PRN Temp greater or equal to 38.5C (101.3F)  ALBUTerol    90 MICROgram(s) HFA Inhaler 2 Puff(s) Inhalation every 6 hours PRN Shortness of Breath  benzonatate 100 milliGRAM(s) Oral every 8 hours PRN Cough  guaiFENesin   Syrup  (Sugar-Free) 100 milliGRAM(s) Oral every 6 hours PRN Cough      Vital Signs Last 24 Hrs  T(C): 36.7 (31 Jan 2021 05:02), Max: 36.9 (30 Jan 2021 12:46)  T(F): 98 (31 Jan 2021 05:02), Max: 98.4 (30 Jan 2021 12:46)  HR: 71 (31 Jan 2021 07:30) (68 - 82)  BP: 107/57 (31 Jan 2021 05:02) (99/52 - 107/57)  BP(mean): --  RR: 18 (31 Jan 2021 05:02) (18 - 20)  SpO2: 90% (31 Jan 2021 07:30) (90% - 97%)  CAPILLARY BLOOD GLUCOSE        I&O's Summary    30 Jan 2021 07:01  -  31 Jan 2021 07:00  --------------------------------------------------------  IN: 240 mL / OUT: 0 mL / NET: 240 mL        PHYSICAL EXAM:  GENERAL: NAD, well-developed  HEAD:  Atraumatic, Normocephalic  EYES: EOMI, PERRLA, conjunctiva and sclera clear  NECK: Supple, No JVD  CHEST/LUNG: Clear to auscultation bilaterally; No wheeze  HEART: Regular rate and rhythm; No murmurs, rubs, or gallops  ABDOMEN: Soft, Nontender, Nondistended; Bowel sounds present  EXTREMITIES:  2+ Peripheral Pulses, No clubbing, cyanosis, or edema  PSYCH: AAOx3  NEUROLOGY: non-focal  SKIN: No rashes or lesions    LABS:                        12.6   9.44  )-----------( 420      ( 31 Jan 2021 07:48 )             39.8     01-31    136  |  99  |  22  ----------------------------<  91  4.4   |  26  |  0.60    Ca    9.4      31 Jan 2021 07:48                RADIOLOGY & ADDITIONAL TESTS:    Imaging Personally Reviewed: < from: Xray Chest 1 View- PORTABLE-Routine (Xray Chest 1 View- PORTABLE-Routine .) (01.30.21 @ 11:46) >  Impression: Grossly clear lungs.    < end of copied text >      Consultant(s) Notes Reviewed:      Care Discussed with Consultants/Other Providers:   Patient is a 55y old  Female who presents with a chief complaint of SOB with gen. weakness; (30 Jan 2021 09:34)      SUBJECTIVE / OVERNIGHT EVENTS:  Patient has no new complaints. Denies cp, SOB, abdominal pain, N/V/D     MEDICATIONS  (STANDING):  dexAMETHasone  Injectable 6 milliGRAM(s) IV Push daily  enoxaparin Injectable 40 milliGRAM(s) SubCutaneous every 12 hours  gabapentin 300 milliGRAM(s) Oral two times a day  pantoprazole    Tablet 40 milliGRAM(s) Oral before breakfast  polyethylene glycol 3350 17 Gram(s) Oral daily  senna 2 Tablet(s) Oral at bedtime  valsartan 160 milliGRAM(s) Oral daily    MEDICATIONS  (PRN):  acetaminophen   Tablet .. 650 milliGRAM(s) Oral every 4 hours PRN Temp greater or equal to 38.5C (101.3F)  ALBUTerol    90 MICROgram(s) HFA Inhaler 2 Puff(s) Inhalation every 6 hours PRN Shortness of Breath  benzonatate 100 milliGRAM(s) Oral every 8 hours PRN Cough  guaiFENesin   Syrup  (Sugar-Free) 100 milliGRAM(s) Oral every 6 hours PRN Cough      Vital Signs Last 24 Hrs  T(C): 36.7 (31 Jan 2021 05:02), Max: 36.9 (30 Jan 2021 12:46)  T(F): 98 (31 Jan 2021 05:02), Max: 98.4 (30 Jan 2021 12:46)  HR: 71 (31 Jan 2021 07:30) (68 - 82)  BP: 107/57 (31 Jan 2021 05:02) (99/52 - 107/57)  BP(mean): --  RR: 18 (31 Jan 2021 05:02) (18 - 20)  SpO2: 90% (31 Jan 2021 07:30) (90% - 97%)  CAPILLARY BLOOD GLUCOSE        I&O's Summary    30 Jan 2021 07:01  -  31 Jan 2021 07:00  --------------------------------------------------------  IN: 240 mL / OUT: 0 mL / NET: 240 mL        PHYSICAL EXAM:  GENERAL: NAD, well-developed  HEAD:  Atraumatic, Normocephalic  EYES: EOMI, PERRLA, conjunctiva and sclera clear  NECK: Supple, No JVD  CHEST/LUNG: Clear to auscultation bilaterally; No wheeze  HEART: Regular rate and rhythm; No murmurs, rubs, or gallops  ABDOMEN: Soft, Nontender, Nondistended; Bowel sounds present  EXTREMITIES:  2+ Peripheral Pulses, No clubbing, cyanosis, or edema  PSYCH: AAOx3  NEUROLOGY: non-focal  SKIN: No rashes or lesions    LABS:                        12.6   9.44  )-----------( 420      ( 31 Jan 2021 07:48 )             39.8     01-31    136  |  99  |  22  ----------------------------<  91  4.4   |  26  |  0.60    Ca    9.4      31 Jan 2021 07:48                RADIOLOGY & ADDITIONAL TESTS:    Imaging Personally Reviewed: < from: Xray Chest 1 View- PORTABLE-Routine (Xray Chest 1 View- PORTABLE-Routine .) (01.30.21 @ 11:46) >  Impression: Grossly clear lungs.    < end of copied text >      Consultant(s) Notes Reviewed:      Care Discussed with Consultants/Other Providers:

## 2021-01-31 NOTE — PROGRESS NOTE ADULT - PROBLEM SELECTOR PROBLEM 6
EKG abnormality

## 2021-01-31 NOTE — CHART NOTE - NSCHARTNOTEFT_GEN_A_CORE
Patient states pharmacy (both [facility] and [facility]) doest carry Xarelto until Tuesday. [facility] pharmacy will give patient 1 pill of Xarelto for Monday and patient will  prescription on Tuesday. Lovenox had to be discontinued in order to put in Xarelto order as per [facility] pharmacist. If patient does not get discharged 1/31 then Lovenox 40mg bid should be reordered.

## 2021-01-31 NOTE — PROGRESS NOTE ADULT - PROBLEM SELECTOR PLAN 1
Acute hypoxic respiratory failure d/t COVID   Currently on RA; but hypoxic with ambulation and speaking  encouraged incentive spirometry and chest PT  c/w albuterol MDI  monitor on continuos pulse oximetry  Management of COVID as below  repeat CXR on 1/30 showed clear lungs. Acute hypoxic respiratory failure d/t COVID; now resolved  Currently on RA; patient clinically improved; will check ambulatory O2.  encouraged incentive spirometry and chest PT  c/w albuterol MDI  Management of COVID as below  repeat CXR on 1/30 showed clear lungs.

## 2021-01-31 NOTE — PROGRESS NOTE ADULT - PROBLEM SELECTOR PLAN 4
patient reports borderline DM   not on any oral meds/insulin   Hgb A1c 6.3  Monitor glucose while on steroids

## 2021-01-31 NOTE — PROGRESS NOTE ADULT - REASON FOR ADMISSION
SOB with gen. weakness;

## 2021-01-31 NOTE — PROGRESS NOTE ADULT - ASSESSMENT
55 y.o. Obese Female w/ hx Asthma, ELINOR, HTN, HLD, Type 2 DM, Fibromyalgia, GERD a/w hypoxemia  2/2 COVID-19.  55 y.o. Obese Female w/ hx Asthma, ELINOR, HTN, HLD, Type 2 DM, Fibromyalgia, GERD a/w hypoxemia  2/2 COVID-19. D/C 40 minutes.

## 2021-01-31 NOTE — PROGRESS NOTE ADULT - PROBLEM SELECTOR PLAN 5
hx of ELINOR  CPAP QHS  monitor on continuous pulse ox

## 2021-01-31 NOTE — PROGRESS NOTE ADULT - PROBLEM SELECTOR PROBLEM 5
Obstructive sleep apnea

## 2021-01-31 NOTE — DISCHARGE NOTE NURSING/CASE MANAGEMENT/SOCIAL WORK - NSSCNAMETXT_GEN_ALL_CORE
[facility] . Initial visit will be day after discharge home. A nurse will call prior to home visit

## 2021-01-31 NOTE — PROGRESS NOTE ADULT - PROBLEM SELECTOR PLAN 3
monitor blood pressure  On Valsartan- HCTZ QD at home   Will hold HCTZ and continue Valsartan   DASH diet
monitor blood pressure  On Valsartan- HCTZ QD at home   Will hold HCTZ and continue Valsartan   DASH diet
stable  c/w valsartan  can restart HCTZ on discharge  DASH diet
monitor blood pressure  On Valsartan- HCTZ QD at home   Will hold HCTZ and continue Valsartan   DASH diet
stable  c/w valsartan  can restart HCTZ on discharge  DASH diet
stable  c/w valsartan  can restart HCTZ on discharge  DASH diet

## 2021-01-31 NOTE — DISCHARGE NOTE NURSING/CASE MANAGEMENT/SOCIAL WORK - PATIENT PORTAL LINK FT
You can access the FollowMyHealth Patient Portal offered by [facility] by registering at the following website: [email or web address]. By joining [facility]’s FollowMyHealth portal, you will also be able to view your health information using other applications (apps) compatible with our system.

## 2021-02-01 ENCOUNTER — FORM ENCOUNTER (OUTPATIENT)
Age: 56
End: 2021-02-01

## 2021-02-01 ENCOUNTER — APPOINTMENT (OUTPATIENT)
Dept: PULMONOLOGY | Facility: CLINIC | Age: 56
End: 2021-02-01
Payer: COMMERCIAL

## 2021-02-01 PROBLEM — Z00.00 ENCOUNTER FOR PREVENTIVE HEALTH EXAMINATION: Status: ACTIVE | Noted: 2021-02-01

## 2021-02-01 PROCEDURE — 99203 OFFICE O/P NEW LOW 30 MIN: CPT | Mod: 95

## 2021-02-01 NOTE — HISTORY OF PRESENT ILLNESS
[Home] : at home, [unfilled] , at the time of the visit. [Medical Office: ([facility])___] : at the medical office located in  [Verbal consent obtained from patient] : the patient, [unfilled] [FreeTextEntry1] : initial telehealth visit, d/c from the hospital last night, COVID\par \par She was admitted 1/23-1/31\par Tx initially with 02, remdesivir, dexa\par \par She did not require 02 at discharge.\par THough she said she begged them to give it to her anyway because it makes her feel more at ease to have it\par Upset she didn’t get it\par \par CXR was negative\par \par PMH DM (not on meds), obesity, htn. ELINOR - has cpap but doesn’t use, asthma - has qvar but not using\par \par Lives alone. 3rd floor walk up.\par Said when she was 1st dx with covid, [employer] sent her food. But today she cant reach them. She is very concerned.\par Her friend did deliver her food last night, but cant get to her today because of the snow.\par \par She was d/c with a script for xarelot 10, but [facility] cant deliver until tomorrow.\par D Dimer on d/c was 373\par \par On exam, pt appears very anxious, speaking and breathing rapidly\par Her pulse ox is 99% on RA even after ambulation. . \par she is afebrile\par She took her BP this morning as well, 109/59. has been low since covid ,, her valsartan is on hold\par \par Pt advised to make sure to take enough fluids\par Monitor 02 sats\par And, I am referring to [facility] so an RN can follow up with her, help her with other needs -- she seemed relieved by this\par \par

## 2021-02-02 ENCOUNTER — NON-APPOINTMENT (OUTPATIENT)
Age: 56
End: 2021-02-02

## 2021-02-02 ENCOUNTER — APPOINTMENT (OUTPATIENT)
Dept: PULMONOLOGY | Facility: CLINIC | Age: 56
End: 2021-02-02
Payer: COMMERCIAL

## 2021-02-02 PROCEDURE — 99213 OFFICE O/P EST LOW 20 MIN: CPT | Mod: 95

## 2021-02-02 NOTE — HISTORY OF PRESENT ILLNESS
[Home] : at home, [unfilled] , at the time of the visit. [Medical Office: ([facility])___] : at the medical office located in  [Verbal consent obtained from patient] : the patient, [unfilled] [FreeTextEntry1] : pt called my office stating she needs 02 this morning\par I also spoke with homecare --  when homecare (arranged from [facility]) called this Am she told them she was very out of breath and 02 was low, though she said pulse ox was in the other room and she would not get it. She also reiterated concern about not reaching [employer] for food delivery.Homecare advised they could send an ambulance. She refused. A Homecare RN will be going there this afternoon to evaluate\par \par f/u TEB visit - \par Pt is lying in bed, speaking clearly no resp distress, but c/o be exhausted.\par I asked her to please get the pulse ox so I can evaluate (yesterday she was 99% while complaining), but she refused, stating she just got into bed. she wants to wait for homecare RN to come\par \par RN will speak to me when she arrives and evaluates

## 2021-02-05 ENCOUNTER — APPOINTMENT (OUTPATIENT)
Dept: PULMONOLOGY | Facility: CLINIC | Age: 56
End: 2021-02-05
Payer: COMMERCIAL

## 2021-02-05 ENCOUNTER — APPOINTMENT (OUTPATIENT)
Dept: PULMONOLOGY | Facility: CLINIC | Age: 56
End: 2021-02-05

## 2021-02-05 DIAGNOSIS — E11.9 TYPE 2 DIABETES MELLITUS W/OUT COMPLICATIONS: ICD-10-CM

## 2021-02-05 DIAGNOSIS — E66.9 OBESITY, UNSPECIFIED: ICD-10-CM

## 2021-02-05 DIAGNOSIS — I10 ESSENTIAL (PRIMARY) HYPERTENSION: ICD-10-CM

## 2021-02-05 DIAGNOSIS — E78.5 HYPERLIPIDEMIA, UNSPECIFIED: ICD-10-CM

## 2021-02-05 PROCEDURE — 99212 OFFICE O/P EST SF 10 MIN: CPT | Mod: 95

## 2021-02-08 NOTE — HISTORY OF PRESENT ILLNESS
Patient accepted by Dr. Dann Fried to 7S, room 9453. Diagnosis depression. Liudmila in admitting notified.       Michael Tinoco, RN  10/03/18 [id number] [Home] : at home, [unfilled] , at the time of the visit. [Medical Office: ([facility])___] : at the medical office located in  [Verbal consent obtained from patient] : the patient, [unfilled] [FreeTextEntry1] : Follow up [facility].\par Pt. dc'd from hosp. 1/31/21, no 02 requirements, followed by [facility].\par Supplied orders for home care RN visits today.\par Despite many attempts, could not get video to work, converted to telephone visit.\par Pt speaking in full sentences, without breathlessness.\par During visit, 02 SAt at rest via pulse oximetry was 98-99% at rest with pulse of 72 - 82.\par She walked in apt. x 1 minute, although she become SOB, 02 Sat remained above 95% throughout.\par Denies cough or fever. States she wakes up in night due to sleep apnea, and lying flat, and hasn't slept many hours since hospitalization.  Has not used CPAP machine in 1 year. Has been trying to get vendor to fix / supply new machine. Also concerned about food delivery from [employer]. Not drinking much.\par \par \par \par I was present for the key portions of the history elicited by the nurse practitioner. I agree with the assessment and plan as written\par \par \par \par \par \par \par \par \par \par \par \par \par \par \par \par \par \par \par \par \par \par \par \par \par \par \par \par \par \par \par \par \par \par \par \par \par \par \par \par \par I was present for the key portions of the history and physical examination elicited by the nurse practitioner. I agree with the assessment and plan as written\par \par \par \par \par \par \par \par \par \par \par \par Has scheduled appointment with Dr. Lea on 2/9/2021.\par Imp: s/p hospitalization for SOB/hypoxia/COVID. Multiple comorbidities.\par SP02 stable.\par Plan: F/U appointment on 2/9/2021 for pulm. assessment. Will engage office staff to assist with home equipment as possible. Advised to increase fluid intake and monitor urine until clear.

## 2021-02-09 ENCOUNTER — APPOINTMENT (OUTPATIENT)
Dept: PULMONOLOGY | Facility: CLINIC | Age: 56
End: 2021-02-09
Payer: COMMERCIAL

## 2021-02-09 PROCEDURE — 99213 OFFICE O/P EST LOW 20 MIN: CPT | Mod: 95

## 2021-02-09 NOTE — PLAN
[FreeTextEntry1] : Patient [id number]. Told to speak to Saumya or Vivian about cpap machine if unable to get through to the vendor.

## 2021-02-09 NOTE — HISTORY OF PRESENT ILLNESS
[Home] : at home, [unfilled] , at the time of the visit. [Medical Office: ([facility])___] : at the medical office located in  [Verbal consent obtained from patient] : the patient, [unfilled] [FreeTextEntry1] : Patient doing much better than last week. Patient tested positive 1/13, initially felt better, felt markedly worse on 1/23. Dyspnea, weakness, weight loss. Admitted hospital x 8 days. Now home since 1/31, continues to improve. Still notes dysponea with minimal exertion. Difficulty sleeping with insomnia. No longer on any steroids. On xarelto 10 mg. Yesterday was fatigued, today has more energy. Has oximetry levels that are consistently above 90, On today's visit level was 99. Had blisters in mouth that are clearing up.

## 2021-02-19 ENCOUNTER — NON-APPOINTMENT (OUTPATIENT)
Age: 56
End: 2021-02-19

## 2021-02-23 ENCOUNTER — APPOINTMENT (OUTPATIENT)
Dept: PULMONOLOGY | Facility: CLINIC | Age: 56
End: 2021-02-23
Payer: COMMERCIAL

## 2021-02-23 DIAGNOSIS — U07.1 COVID-19: ICD-10-CM

## 2021-02-23 PROCEDURE — 99213 OFFICE O/P EST LOW 20 MIN: CPT | Mod: 95

## 2021-02-23 PROCEDURE — 99203 OFFICE O/P NEW LOW 30 MIN: CPT | Mod: 95

## 2021-02-23 NOTE — HISTORY OF PRESENT ILLNESS
[Home] : at home, [unfilled] , at the time of the visit. [Medical Office: ([facility])___] : at the medical office located in  [Verbal consent obtained from patient] : the patient, [unfilled] [FreeTextEntry1] : Patient doing well. Slow improvement. Trying to procure appointment with sleep physician for insomnia and cpap problems.  PCP ordered another CXR tomorrow. Now covid negative. \par \par Has been using rescue inhaler occasionally in the morning. Has done this every morning. \par \par Using nystatin swish and swallow for thrush

## 2021-02-23 NOTE — PLAN
[FreeTextEntry1] : Patient with covid 19. Will  stay oow till 3/7 for continued symptoms. Arnuity inhaler for persistent asthma symptoms. Fu with me for asthma in 3 months. Sleep physician for jassi and cpap issues.

## 2021-02-24 ENCOUNTER — NON-APPOINTMENT (OUTPATIENT)
Age: 56
End: 2021-02-24

## 2021-02-24 DIAGNOSIS — J45.909 UNSPECIFIED ASTHMA, UNCOMPLICATED: ICD-10-CM

## 2021-02-24 RX ORDER — FLUTICASONE PROPIONATE 110 UG/1
110 AEROSOL, METERED RESPIRATORY (INHALATION)
Qty: 3 | Refills: 3 | Status: ACTIVE | COMMUNITY
Start: 2021-02-24 | End: 1900-01-01

## 2021-02-24 RX ORDER — BUDESONIDE AND FORMOTEROL FUMARATE DIHYDRATE 80; 4.5 UG/1; UG/1
80-4.5 AEROSOL RESPIRATORY (INHALATION)
Qty: 10.2 | Refills: 2 | Status: ACTIVE | COMMUNITY
Start: 2021-02-24 | End: 1900-01-01

## 2021-02-24 RX ORDER — FLUTICASONE FUROATE 100 UG/1
100 POWDER RESPIRATORY (INHALATION) DAILY
Qty: 1 | Refills: 11 | Status: DISCONTINUED | COMMUNITY
Start: 2021-02-23 | End: 2021-02-24

## 2021-03-09 ENCOUNTER — APPOINTMENT (OUTPATIENT)
Dept: PULMONOLOGY | Facility: CLINIC | Age: 56
End: 2021-03-09
Payer: COMMERCIAL

## 2021-03-09 DIAGNOSIS — R06.83 SNORING: ICD-10-CM

## 2021-03-09 PROCEDURE — 99204 OFFICE O/P NEW MOD 45 MIN: CPT | Mod: 95

## 2021-03-09 NOTE — ASSESSMENT
[FreeTextEntry1] : 56yo F with heavy snoring, nocturnal gasping, choking, witnessed apneas, sleep fragmentation. She wakes up exhausted in the morning and is sleepy throughout the day. She also gets very little sleep duration because she wakes up early for her job and her sleep fragmentation. She has had a diagnostic sleep study in 2018 -- AHI was in severe range at 48.7/h. She was subsequently titrated with CPAP at 10 cm h20. She tried this but for various reasons stopped using it. She wants to resume therapy with CPAP at this point. \par \par She has had bariatric surgery with significant weight loss. However, she was diagnosed with hernia and had hernia repair surgery. Her weight has hovered around 180 lbs since then. \par \par She also has asthma and had a recent CXR -- report indicates that lungs were clear. \par \par \par Will order new machine -- APAP\par I explained the rationale for treatment of ELINOR -- to improve quality of life, daytime function and to decrease the cardiometabolic and other medical risks that are associated with untreated ELINOR. The patient verbalized understanding.\par RTC 1 month

## 2021-03-09 NOTE — HISTORY OF PRESENT ILLNESS
[FreeTextEntry1] : 56yo F with heavy snoring, nocturnal gasping, choking, witnessed apneas, sleep fragmentation. She wakes up exhausted in the morning and is sleepy throughout the day. She also gets very little sleep duration because she wakes up early for her job and her sleep fragmentation. She has had a diagnostic sleep study in 2018 -- AHI was in severe range at 48.7/h. She was subsequently titrated with CPAP at 10 cm h20. She tried this but for various reasons stopped using it. She wants to resume therapy with CPAP at this point. \par \par She has had bariatric surgery with significant weight loss. However, she was diagnosed with hernia and had hernia repair surgery. Her weight has hovered around 180 lbs since then. \par \par She also has asthma and had a recent CXR -- report indicates that lungs were clear. \par

## 2021-03-09 NOTE — CONSULT LETTER
[Dear  ___] : Dear  [unfilled], [Consult Letter:] : I had the pleasure of evaluating your patient, [unfilled]. [Please see my note below.] : Please see my note below. [Consult Closing:] : Thank you very much for allowing me to participate in the care of this patient.  If you have any questions, please do not hesitate to contact me. [Sincerely,] : Sincerely, [FreeTextEntry3] : Amanda Reyes MD

## 2021-03-09 NOTE — REVIEW OF SYSTEMS
[EDS: ESS=____] : daytime somnolence: ESS=[unfilled] [Fatigue] : fatigue [Snoring] : snoring [Witnessed Apneas] : witnessed apnea [A.M. Dry Mouth] : a.m. dry mouth [Obesity] : obesity [Difficulty Maintaining Sleep] : difficulty maintaining sleep [Negative] : Psychiatric [Chest Pain] : no chest pain [Anemia] : no anemia [A.M. Headache] : no headache present upon awakening [Difficulty Initiating Sleep] : no difficulty falling asleep [Lower Extremity Discomfort] : no lower extremity discomfort [Unusual Sleep Behavior] : no unusual sleep behavior [Cataplexy] :  no cataplexy

## 2021-04-13 ENCOUNTER — APPOINTMENT (OUTPATIENT)
Dept: PULMONOLOGY | Facility: CLINIC | Age: 56
End: 2021-04-13
Payer: COMMERCIAL

## 2021-04-13 PROCEDURE — 99214 OFFICE O/P EST MOD 30 MIN: CPT | Mod: 95

## 2021-04-13 NOTE — HISTORY OF PRESENT ILLNESS
[FreeTextEntry1] : 56yo F with heavy snoring, nocturnal gasping, choking, witnessed apneas, sleep fragmentation. She wakes up exhausted in the morning and is sleepy throughout the day. She also gets very little sleep duration because she wakes up early for her job and her sleep fragmentation. She has had a diagnostic sleep study in 2018 -- AHI was in severe range at 48.7/h. She was subsequently titrated with CPAP at 10 cm h20. She tried this but for various reasons stopped using it. She resumed this recently\par \par She has had bariatric surgery with significant weight loss. However, she was diagnosed with hernia and had hernia repair surgery. Her weight has hovered around 180 lbs since then. \par \par She was recently diagnosed with a second bout of COVID and is not feeling well right now. She has fatigue and feels tired. She has been using CPAP almost every night but is limited by her insomnia. DAta are not available. She does feel benefit from this. The mask is also better than her prior ones. \par

## 2021-04-13 NOTE — ASSESSMENT
[FreeTextEntry1] : 54yo F with heavy snoring, nocturnal gasping, choking, witnessed apneas, sleep fragmentation. She wakes up exhausted in the morning and is sleepy throughout the day. She also gets very little sleep duration because she wakes up early for her job and her sleep fragmentation. She has had a diagnostic sleep study in 2018 -- AHI was in severe range at 48.7/h. She was subsequently titrated with CPAP at 10 cm h20. She tried this but for various reasons stopped using it. She resumed this recently\par \par She has had bariatric surgery with significant weight loss. However, she was diagnosed with hernia and had hernia repair surgery. Her weight has hovered around 180 lbs since then. \par \par She was recently diagnosed with a second bout of COVID and is not feeling well right now. She has fatigue and feels tired. She has been using CPAP almost every night but is limited by her insomnia. DAta are not available. She does feel benefit from this. The mask is also better than her prior ones. \par \par \par Encouraged continued CPAP use as she is deriving benefit from this\par Will ask DME to link machine to my account so that I may review data\par I explained the rationale for treatment of ELINOR -- to improve quality of life, daytime function and to decrease the cardiometabolic and other medical risks that are associated with untreated ELINOR. The patient verbalized understanding.\par \par RTC 1 month

## 2021-04-13 NOTE — REVIEW OF SYSTEMS
[EDS: ESS=____] : daytime somnolence: ESS=[unfilled] [Fatigue] : fatigue [A.M. Dry Mouth] : a.m. dry mouth [Obesity] : obesity [Difficulty Maintaining Sleep] : difficulty maintaining sleep [Negative] : Psychiatric [Snoring] : no snoring [Witnessed Apneas] : no witnessed apnea [Chest Pain] : no chest pain [Anemia] : no anemia [A.M. Headache] : no headache present upon awakening [Difficulty Initiating Sleep] : no difficulty falling asleep [Lower Extremity Discomfort] : no lower extremity discomfort [Unusual Sleep Behavior] : no unusual sleep behavior [Cataplexy] :  no cataplexy

## 2021-05-12 ENCOUNTER — APPOINTMENT (OUTPATIENT)
Dept: PULMONOLOGY | Facility: CLINIC | Age: 56
End: 2021-05-12
Payer: COMMERCIAL

## 2021-05-12 DIAGNOSIS — G47.33 OBSTRUCTIVE SLEEP APNEA (ADULT) (PEDIATRIC): ICD-10-CM

## 2021-05-12 DIAGNOSIS — G25.81 RESTLESS LEGS SYNDROME: ICD-10-CM

## 2021-05-12 PROCEDURE — 99214 OFFICE O/P EST MOD 30 MIN: CPT | Mod: 95

## 2021-05-12 NOTE — REVIEW OF SYSTEMS
[A.M. Dry Mouth] : a.m. dry mouth [Obesity] : obesity [Difficulty Maintaining Sleep] : difficulty maintaining sleep [Negative] : Psychiatric [EDS: ESS=____] : no daytime somnolence [Fatigue] : no fatigue [Snoring] : no snoring [Witnessed Apneas] : no witnessed apnea [Chest Pain] : no chest pain [Anemia] : no anemia [A.M. Headache] : no headache present upon awakening [Difficulty Initiating Sleep] : no difficulty falling asleep [Lower Extremity Discomfort] : no lower extremity discomfort [Unusual Sleep Behavior] : no unusual sleep behavior [Cataplexy] :  no cataplexy

## 2021-05-12 NOTE — HISTORY OF PRESENT ILLNESS
[FreeTextEntry1] : 54yo F with heavy snoring, nocturnal gasping, choking, witnessed apneas, sleep fragmentation. She wakes up exhausted in the morning and is sleepy throughout the day. She also gets very little sleep duration because she wakes up early for her job and her sleep fragmentation. She has had a diagnostic sleep study in 2018 -- AHI was in severe range at 48.7/h. She was subsequently titrated with CPAP at 10 cm h20. She tried this but for various reasons stopped using it. She resumed this recently\par \par She has had bariatric surgery with significant weight loss. However, she was diagnosed with hernia and had hernia repair surgery. Her weight has hovered around 180 lbs since then. \par \par She had COVID recently and then the vaccines. She uses her machine and feels better since doing so. \par \par Data from machine was reviewed and is as follows:\par DME -- [employer]\par Device -- Dreamstation\par Percent days with device usage in last 30 days -- 76.7%\par Average usage (days used) -- 3h 54min\par Average treatment related FLY -- 4.3/h\par Mask leakage -- none\par Pressure -- APAP 90% percentile pressure is 8.8 cm h20\par \par She also complains of an urge to move legs in the evening, consistent with restless legs syndrome. This happens only in the evenings and sensation is relieved with movement of extremities.

## 2021-05-12 NOTE — ASSESSMENT
[FreeTextEntry1] : 56yo F with heavy snoring, nocturnal gasping, choking, witnessed apneas, sleep fragmentation. She wakes up exhausted in the morning and is sleepy throughout the day. She also gets very little sleep duration because she wakes up early for her job and her sleep fragmentation. She has had a diagnostic sleep study in 2018 -- AHI was in severe range at 48.7/h. She was subsequently titrated with CPAP at 10 cm h20. She tried this but for various reasons stopped using it. She resumed this recently\par \par She has had bariatric surgery with significant weight loss. However, she was diagnosed with hernia and had hernia repair surgery. Her weight has hovered around 180 lbs since then. \par \par She had COVID recently and then the vaccines. She uses her machine and feels better since doing so. \par \par Data from machine was reviewed and is as follows:\par DME -- [employer]\par Device -- Dreamstation\par Percent days with device usage in last 30 days -- 76.7%\par Average usage (days used) -- 3h 54min\par Average treatment related FLY -- 4.3/h\par Mask leakage -- none\par Pressure -- APAP 90% percentile pressure is 8.8 cm h20\par \par She also complains of an urge to move legs in the evening, consistent with restless legs syndrome. This happens only in the evenings and sensation is relieved with movement of extremities. \par \par RLS\par Patient to get iron studies including ferritin through her PCP\par \par ELINOR\par Patient to continue CPAP nightly and encouraged increased CPAP usage since she is deriving benefit from this\par I explained the rationale for treatment of ELINOR -- to improve quality of life, daytime function and to decrease the cardiometabolic and other medical risks that are associated with untreated ELINOR. The patient verbalized understanding.

## 2021-05-12 NOTE — REASON FOR VISIT
[Home] : at home, [unfilled] , at the time of the visit. [Medical Office: ([facility])___] : at the medical office located in  [Verbal consent obtained from patient] : the patient, [unfilled] [Follow-Up] : a follow-up visit [FreeTextEntry1] : ELINOR

## 2021-05-24 ENCOUNTER — APPOINTMENT (OUTPATIENT)
Dept: PULMONOLOGY | Facility: CLINIC | Age: 56
End: 2021-05-24
Payer: COMMERCIAL

## 2021-05-24 PROCEDURE — 99214 OFFICE O/P EST MOD 30 MIN: CPT | Mod: 95

## 2021-05-24 NOTE — PLAN
[FreeTextEntry1] : Continue  budesonide - F/u in 1 month or earlier for worsening symptoms.\par \par I, Carmen South NP, am scribing for and in the presence of Dr. Marcos Lea, the following sections HISTORY OF PRESENT ILLNESS, PAST MEDICAL/FAMILY/SOCIAL HISTORY; REVIEW OF SYSTEMS; VITAL SIGNS; PHYSICAL EXAM; DISPOSITION.\par

## 2021-05-24 NOTE — HISTORY OF PRESENT ILLNESS
[Home] : at home, [unfilled] , at the time of the visit. [Medical Office: ([facility])___] : at the medical office located in  [Verbal consent obtained from patient] : the patient, [unfilled] [FreeTextEntry1] : Pt retested positive - continues with back/neck pain. Has ++ cough. Stopped inhaler and CPAP ^ heat and cough. \par She completed her  vaccination for Covid 19-

## 2021-05-26 DIAGNOSIS — U07.1 COVID-19: ICD-10-CM

## 2021-07-09 ENCOUNTER — APPOINTMENT (OUTPATIENT)
Dept: PULMONOLOGY | Facility: CLINIC | Age: 56
End: 2021-07-09
Payer: COMMERCIAL

## 2021-07-09 VITALS
HEIGHT: 60 IN | WEIGHT: 185 LBS | OXYGEN SATURATION: 97 % | SYSTOLIC BLOOD PRESSURE: 149 MMHG | BODY MASS INDEX: 36.32 KG/M2 | DIASTOLIC BLOOD PRESSURE: 91 MMHG | RESPIRATION RATE: 17 BRPM | TEMPERATURE: 97.2 F | HEART RATE: 74 BPM

## 2021-07-09 DIAGNOSIS — R05 COUGH: ICD-10-CM

## 2021-07-09 DIAGNOSIS — B94.8 SEQUELAE OF OTHER SPECIFIED INFECTIOUS AND PARASITIC DISEASES: ICD-10-CM

## 2021-07-09 PROCEDURE — ZZZZZ: CPT

## 2021-07-09 PROCEDURE — 99214 OFFICE O/P EST MOD 30 MIN: CPT | Mod: 25

## 2021-07-09 PROCEDURE — 94726 PLETHYSMOGRAPHY LUNG VOLUMES: CPT

## 2021-07-09 PROCEDURE — 94010 BREATHING CAPACITY TEST: CPT

## 2021-07-09 PROCEDURE — 94729 DIFFUSING CAPACITY: CPT

## 2021-07-09 NOTE — HISTORY OF PRESENT ILLNESS
[TextBox_4] : Pt here for follow up persistent cough following Covid 19 infection n January. She has ongoing lingering cough ,causes occasional stress incontinence. She developed oral thrush on Symbicort and has stopped inhaler. \par \par s/p endoscopy which resulted in +esophagitis, gastritis, hiatal hernia. Prescribed omeprazole but she is pending insurance approval.\par

## 2021-07-09 NOTE — ASSESSMENT
[FreeTextEntry1] : PFT with some restriction may be related to overweight. Con omeprazole as prescribed and RTC prn. \par \par I, Carmen South NP, am scribing for and in the presence of Dr. Marcos Lea, the following sections HISTORY OF PRESENT ILLNESS, PAST MEDICAL/FAMILY/SOCIAL HISTORY; REVIEW OF SYSTEMS; VITAL SIGNS; PHYSICAL EXAM; DISPOSITION.\par \par

## 2021-08-20 ENCOUNTER — APPOINTMENT (OUTPATIENT)
Dept: ORTHOPEDIC SURGERY | Facility: CLINIC | Age: 56
End: 2021-08-20

## 2021-08-20 ENCOUNTER — APPOINTMENT (OUTPATIENT)
Dept: ORTHOPEDIC SURGERY | Facility: CLINIC | Age: 56
End: 2021-08-20
Payer: COMMERCIAL

## 2021-08-20 VITALS
OXYGEN SATURATION: 97 % | BODY MASS INDEX: 38.83 KG/M2 | HEIGHT: 58 IN | DIASTOLIC BLOOD PRESSURE: 87 MMHG | SYSTOLIC BLOOD PRESSURE: 133 MMHG | WEIGHT: 185 LBS | HEART RATE: 92 BPM

## 2021-08-20 DIAGNOSIS — Z87.39 PERSONAL HISTORY OF OTHER DISEASES OF THE MUSCULOSKELETAL SYSTEM AND CONNECTIVE TISSUE: ICD-10-CM

## 2021-08-20 DIAGNOSIS — Z60.2 PROBLEMS RELATED TO LIVING ALONE: ICD-10-CM

## 2021-08-20 DIAGNOSIS — Z87.19 PERSONAL HISTORY OF OTHER DISEASES OF THE DIGESTIVE SYSTEM: ICD-10-CM

## 2021-08-20 DIAGNOSIS — Z86.79 PERSONAL HISTORY OF OTHER DISEASES OF THE CIRCULATORY SYSTEM: ICD-10-CM

## 2021-08-20 DIAGNOSIS — Z87.891 PERSONAL HISTORY OF NICOTINE DEPENDENCE: ICD-10-CM

## 2021-08-20 DIAGNOSIS — Z87.09 PERSONAL HISTORY OF OTHER DISEASES OF THE RESPIRATORY SYSTEM: ICD-10-CM

## 2021-08-20 DIAGNOSIS — Z78.9 OTHER SPECIFIED HEALTH STATUS: ICD-10-CM

## 2021-08-20 PROCEDURE — 20611 DRAIN/INJ JOINT/BURSA W/US: CPT | Mod: RT

## 2021-08-20 PROCEDURE — 99203 OFFICE O/P NEW LOW 30 MIN: CPT | Mod: 25

## 2021-08-20 SDOH — SOCIAL STABILITY - SOCIAL INSECURITY: PROBLEMS RELATED TO LIVING ALONE: Z60.2

## 2021-08-20 NOTE — DISCUSSION/SUMMARY
[de-identified] : The patient was informed of her findings she was given a cortisone injection to the right shoulder where she is most symptomatic.  She will be referred to physical therapy for both shoulders and will follow up in 4 to 6 weeks.  She is unable to take NSAIDs as per her PCP

## 2021-08-20 NOTE — PROCEDURE
[de-identified] : Under sterile technique the right shoulder was injected with Depo-Medrol 40 mg with lidocaine.\par \par A discussion took place with the patient regarding the procedure. General risks and benefits were reviewed.\par The subacromial region of the right shoulder was prepped to attain a sterile field. Ethyl Chloride spray was used as a topical anesthetic. \par A 22-gauge 1-1/2 inch needle was used to inject the medication.\par The procedure was performed utilizing ultrasound guided needle placement with the Sonosite linear transducer in order to visualize and confirm the location of the needle tip and intra-articular delivery of the medication in the exact location desired to achieve maximal benefit from the medication. The images were recorded and saved.\par The injection site was sterilely dressed and the patient tolerated the procedure well, without complications.\par The patient was given post injection instructions including rest, cool pack applications, and take NSAIDs or acetaminophen. The patient was advised that if there are worsening symptoms or any associated problems, to contact our office accordingly

## 2021-08-20 NOTE — HISTORY OF PRESENT ILLNESS
[Worsening] : worsening [___ yrs] : [unfilled] year(s) ago [10] : a maximum pain level of 10/10 [Lifting] : lifting [Constant] : ~He/She~ states the symptoms seem to be constant [Rest] : relieved by rest [de-identified] : Pt presents for initial evaluation with pain in her bilateral shoulders , worse today to the right,  Pt has hx of MRI 2019  bilateral shoulders physical therapy, prescribed by her PCP most recently pt had xrays performed bilateral shoulders, 8/9/2021 Pt has taken gabapentin and Tylenol , pt is under the care of a rheumatologist,  hx pain management  to her shoulders approx 1 year ago.NO NSAIDS as per PCP [de-identified] : certain movements, cannot hold onto objects [de-identified] : medication

## 2021-08-20 NOTE — PHYSICAL EXAM
[de-identified] : Physical examination of both shoulders disclose similar findings of limited painful motion beyond 150 degrees forward flexion or lateral abduction.  Positive impingement's [de-identified] : Review of x-rays taken August 9, 2021 both shoulders disclose mild arthritic changes with bilateral shoulder calcific densities particularly on the left side

## 2021-08-23 ENCOUNTER — APPOINTMENT (OUTPATIENT)
Dept: ORTHOPEDIC SURGERY | Facility: CLINIC | Age: 56
End: 2021-08-23

## 2021-08-24 ENCOUNTER — APPOINTMENT (OUTPATIENT)
Dept: ORTHOPEDIC SURGERY | Facility: CLINIC | Age: 56
End: 2021-08-24

## 2021-08-26 ENCOUNTER — NON-APPOINTMENT (OUTPATIENT)
Age: 56
End: 2021-08-26

## 2021-08-26 ENCOUNTER — APPOINTMENT (OUTPATIENT)
Dept: ORTHOPEDIC SURGERY | Facility: CLINIC | Age: 56
End: 2021-08-26
Payer: COMMERCIAL

## 2021-08-26 VITALS
SYSTOLIC BLOOD PRESSURE: 140 MMHG | HEIGHT: 58 IN | DIASTOLIC BLOOD PRESSURE: 85 MMHG | OXYGEN SATURATION: 97 % | WEIGHT: 185 LBS | HEART RATE: 94 BPM | BODY MASS INDEX: 38.83 KG/M2

## 2021-08-26 PROCEDURE — 72110 X-RAY EXAM L-2 SPINE 4/>VWS: CPT

## 2021-08-26 PROCEDURE — 72050 X-RAY EXAM NECK SPINE 4/5VWS: CPT

## 2021-08-26 PROCEDURE — 99215 OFFICE O/P EST HI 40 MIN: CPT

## 2021-08-26 NOTE — REASON FOR VISIT
[Initial Visit] : an initial visit for [Cervical Myelopathy/Myopathy] : cervical myelopathy/myopathy [Back Pain] : back pain [Neck Pain] : neck pain [Radiculopathy] : radiculopathy

## 2021-08-26 NOTE — PHYSICAL EXAM
[de-identified] : Cervical Physical Exam\par \par Gait -antalgic\par \par Station -normal\par \par Sagittal balance -normal\par \par Compensatory mechanism? -None\par \par Horizontal gaze -maintained\par \par Heel walk -unable to do\par \par Toe walk -unable to do\par \par Reflexes\par Biceps -hyperreflexic\par Triceps -hyperreflexic\par Brachioradialis -hyperreflexic\par Patellar -normal\par Gastroc -hyperreflexic\par Clonus -no\par \par Harrison´s -none\par \par Shoulder Exam -within normal limits given patient's history\par \par Spurling´s -none\par \par Wrist Pulses -2+ radial/ulnar\par \par Foot Pulses -2+ DP/PT\par \par Cervical range of motion -globally reduced\par \par Sensation \par C5-T1 sensation intact to light touch bilaterally\par \par L1-S1 sensation intact to light touch bilaterally\par \par Motor\par \par \par 	Deltoid	Biceps	Triceps	WF	WE	IO	\par Right	5/5	3+/5	5/5	4/5	4/5	3+/5	4/5\par Left	5/5	3+/5	5/5	4/5	4/5	3+/5	4/5\par \par \par 	IP	Quad	HS	TA	Gastroc	EHL\par Right	3+/5	5/5	5/5	5/5	5/5	5/5\par Left	3+/5	5/5	5/5	5/5	5/5	5/5\par \par \par  [de-identified] : Cervical radiographs\par Diffuse osteopenia noted\par Severe disc degeneration noted\par Significant facet arthropathy noted\par No brooke instability on flexion-extension radiographs\par \par Lumbar radiographs\par L4-L5 spondylolisthesis noted\par  there is air in the disc space\par  Facet arthropathy

## 2021-08-26 NOTE — ASSESSMENT
[FreeTextEntry1] : I had a lengthy discussion with the patient regards to her treatment plan and diagnosis.  My opinion she is dealing with significant arm and neck symptoms.  She does have objective weakness findings both in her arms and her legs.  Furthermore she does have upper motor neuron signs.  I do think we have to evaluate for possible cervical spondylotic myelopathy.  Therefore it is absolutely medically necessary for her to get a cervical MRI.  Given her subjective weakness findings in her lower extremities I do also want to move forward with a lumbar MRI.  She can continue with physical therapy focused on her neck and her back.  She can continue with her current pain regimen.  I will have her follow-up in approximately 3 to 4 weeks for repeat clinical evaluation.  I encouraged her to follow-up sooner if she has any new or worsening symptoms.  Please note that over 40 minutes of time spent in care this patient which includes the visit preparation, in person visit, post visit documentation.

## 2021-08-26 NOTE — HISTORY OF PRESENT ILLNESS
[de-identified] : This is a 56-year-old female is been dealing with significant neck and back pain.  In particular  she describes significant neck pain left and right arm pain.  She has symptoms of pain that radiates down into her fingertips bilaterally.  She describes pain over her right index finger and middle finger.  She does endorse issues with balance.  She does endorse issues with dropping objects.  The patient also describes significant back pain.  She has pain that radiates from her back into her legs bilaterally.  Pain is majority over her lateral thigh lateral calf.  At this point she can only walk half a block before she did stop due to pain.  She does feel better worsening for her shoulder.  She denies any bowel bladder issues.  She denies any saddle anesthesia.

## 2021-08-31 ENCOUNTER — APPOINTMENT (OUTPATIENT)
Dept: ORTHOPEDIC SURGERY | Facility: CLINIC | Age: 56
End: 2021-08-31
Payer: COMMERCIAL

## 2021-08-31 VITALS
SYSTOLIC BLOOD PRESSURE: 129 MMHG | HEIGHT: 58 IN | HEART RATE: 88 BPM | OXYGEN SATURATION: 96 % | DIASTOLIC BLOOD PRESSURE: 86 MMHG | BODY MASS INDEX: 38.83 KG/M2 | WEIGHT: 185 LBS

## 2021-08-31 PROCEDURE — 99213 OFFICE O/P EST LOW 20 MIN: CPT

## 2021-08-31 RX ORDER — MELOXICAM 15 MG/1
15 TABLET ORAL
Qty: 30 | Refills: 0 | Status: ACTIVE | COMMUNITY
Start: 2021-08-31 | End: 1900-01-01

## 2021-08-31 NOTE — DISCUSSION/SUMMARY
[de-identified] : Patient was informed of her findings she was advised on starting meloxicam, to be taken cautiously with full precautions with meals.  She will be referred to physical therapy.  Reevaluation in 4 to 6 weeks, if she remains symptomatic we may consider cortisone injection.

## 2021-08-31 NOTE — HISTORY OF PRESENT ILLNESS
[Worsening] : worsening [___ yrs] : [unfilled] year(s) ago [10] : a maximum pain level of 10/10 [Constant] : ~He/She~ states the symptoms seem to be constant [Walking] : worsened by walking [Knee Flexion] : worsened with knee flexion [Heat] : relieved by heat [None] : No relieving factors are noted [de-identified] : Pt returns for follow up for pain in her bilateral knees worse to the right knee, pt has no known injury, pt was seen by her PCP Dr Devicka Persaud xray taken at [facility] 8/17/2021  Pt told to  follow up with an orthopedist, Pt states there is buckling to both knees. [de-identified] : certain movements, ascending and descending stairs, sit to stand, stand to sit [de-identified] : medication

## 2021-08-31 NOTE — PHYSICAL EXAM
[de-identified] : Physical examination of both knees disclose similar findings of medial joint line tenderness to palpation.  Flexion is limited to either side at 110 degrees with terminal pain.  No acute effusions [de-identified] : X-rays of both knees from earlier this month disclose moderate medial joint space narrowing to both knees with peripheral osteophytes.

## 2021-09-07 ENCOUNTER — APPOINTMENT (OUTPATIENT)
Dept: MRI IMAGING | Facility: CLINIC | Age: 56
End: 2021-09-07
Payer: COMMERCIAL

## 2021-09-07 ENCOUNTER — OUTPATIENT (OUTPATIENT)
Dept: OUTPATIENT SERVICES | Facility: HOSPITAL | Age: 56
LOS: 1 days | End: 2021-09-07

## 2021-09-07 ENCOUNTER — RESULT REVIEW (OUTPATIENT)
Age: 56
End: 2021-09-07

## 2021-09-07 DIAGNOSIS — Z98.84 BARIATRIC SURGERY STATUS: Chronic | ICD-10-CM

## 2021-09-07 DIAGNOSIS — Z98.890 OTHER SPECIFIED POSTPROCEDURAL STATES: Chronic | ICD-10-CM

## 2021-09-07 PROCEDURE — 72148 MRI LUMBAR SPINE W/O DYE: CPT | Mod: 26

## 2021-09-07 PROCEDURE — 72141 MRI NECK SPINE W/O DYE: CPT | Mod: 26

## 2021-09-27 ENCOUNTER — APPOINTMENT (OUTPATIENT)
Dept: ORTHOPEDIC SURGERY | Facility: CLINIC | Age: 56
End: 2021-09-27
Payer: COMMERCIAL

## 2021-09-27 VITALS — WEIGHT: 185 LBS | OXYGEN SATURATION: 96 % | BODY MASS INDEX: 38.83 KG/M2 | HEIGHT: 58 IN

## 2021-09-27 DIAGNOSIS — M54.2 CERVICALGIA: ICD-10-CM

## 2021-09-27 PROCEDURE — 99214 OFFICE O/P EST MOD 30 MIN: CPT

## 2021-09-27 NOTE — ASSESSMENT
[FreeTextEntry1] : I will lengthy discussion with the patient regards to her treatment and diagnosis.  In particular we discussed the fact that she does have this issue with balance and hand dexterity.  I would like to have her evaluated by neurology to ensure she has no myelopathy.  At this point her symptoms are mild at best she is walking without any assistive device.  Therefore I am hopeful she does not have myelopathy but I want to have this confirmed by my neurology colleagues.  I have referred her to [facility] for possible epidural steroid injection and other treatment modalities for her hip and back.  I will have her follow-up in 6 to 8 weeks for repeat clinical evaluation.  I encouraged her to follow-up sooner if she has any new or worsening symptoms.

## 2021-09-27 NOTE — PHYSICAL EXAM
[de-identified] : Cervical Physical Exam\par \par Gait -antalgic\par \par Station -normal\par \par Sagittal balance -normal\par \par Compensatory mechanism? -None\par \par Horizontal gaze -maintained\par \par Heel walk -unable to do\par \par Toe walk -unable to do\par \par Reflexes\par Biceps -hyperreflexic\par Triceps -hyperreflexic\par Brachioradialis -hyperreflexic\par Patellar -normal\par Gastroc -hyperreflexic\par Clonus -no\par \par Harrison´s -none\par \par Shoulder Exam -within normal limits given patient's history\par \par Spurling´s -none\par \par Wrist Pulses -2+ radial/ulnar\par \par Foot Pulses -2+ DP/PT\par \par Cervical range of motion -globally reduced\par \par Sensation \par C5-T1 sensation intact to light touch bilaterally\par \par L1-S1 sensation intact to light touch bilaterally\par \par Motor\par \par \par 	Deltoid	Biceps	Triceps	WF	WE	IO	\par Right	5/5	3+/5	5/5	4/5	4/5	3+/5	4/5\par Left	5/5	3+/5	5/5	4/5	4/5	3+/5	4/5\par \par \par 	IP	Quad	HS	TA	Gastroc	EHL\par Right	3+/5	5/5	5/5	5/5	5/5	5/5\par Left	3+/5	5/5	5/5	5/5	5/5	5/5\par \par \par  [de-identified] : Cervical radiographs\par Diffuse osteopenia noted\par Severe disc degeneration noted\par Significant facet arthropathy noted\par No brooke instability on flexion-extension radiographs\par \par Lumbar radiographs\par L4-L5 spondylolisthesis noted\par  there is air in the disc space\par  Facet arthropathy\par \par Cervical MRI\par Moderate stenosis noted in subaxial spine\par \par Lumbar MRI\par There are areas of stenosis\par No severe spinal stenosis noted at any level

## 2021-09-27 NOTE — HISTORY OF PRESENT ILLNESS
[de-identified] : Today the patient states that she is still dealing with significant neck and back symptoms.  She does describe pain down her arms bilaterally.  She does endorse issues with balance and her dexterity.  She is also dealing with significant back and bilateral radicular type symptoms.  The symptoms have been persistent despite conservative management today.\par \par 08/3026/21\par This is a 56-year-old female is been dealing with significant neck and back pain.  In particular  she describes significant neck pain left and right arm pain.  She has symptoms of pain that radiates down into her fingertips bilaterally.  She describes pain over her right index finger and middle finger.  She does endorse issues with balance.  She does endorse issues with dropping objects.  The patient also describes significant back pain.  She has pain that radiates from her back into her legs bilaterally.  Pain is majority over her lateral thigh lateral calf.  At this point she can only walk half a block before she did stop due to pain.  She does feel better worsening for her shoulder.  She denies any bowel bladder issues.  She denies any saddle anesthesia.

## 2021-10-19 ENCOUNTER — APPOINTMENT (OUTPATIENT)
Dept: PHYSICAL MEDICINE AND REHAB | Facility: CLINIC | Age: 56
End: 2021-10-19
Payer: COMMERCIAL

## 2021-10-19 VITALS — BODY MASS INDEX: 38.83 KG/M2 | HEIGHT: 58 IN | RESPIRATION RATE: 16 BRPM | OXYGEN SATURATION: 97 % | WEIGHT: 185 LBS

## 2021-10-19 DIAGNOSIS — M43.16 SPONDYLOLISTHESIS, LUMBAR REGION: ICD-10-CM

## 2021-10-19 DIAGNOSIS — M46.1 SACROILIITIS, NOT ELSEWHERE CLASSIFIED: ICD-10-CM

## 2021-10-19 PROCEDURE — 99204 OFFICE O/P NEW MOD 45 MIN: CPT

## 2021-10-19 RX ORDER — DEXAMETHASONE 4 MG/1
4 TABLET ORAL
Qty: 17 | Refills: 0 | Status: ACTIVE | COMMUNITY
Start: 2021-10-19 | End: 1900-01-01

## 2021-10-19 NOTE — DATA REVIEWED
[MRI] : MRI [FreeTextEntry1] : MR GUI 09/2021: L4/5 Grade 1 Anterolisthesis with degenerative facets and central spinal canal stenosis. L5/S1 degenerative facets bilaterally.

## 2021-10-19 NOTE — HISTORY OF PRESENT ILLNESS
[FreeTextEntry1] : Referring Physician:  Dr. Joseluis Emery MD\par \par Ms. YOBANY DAVIS is a very pleasant 56 year female who comes in for evaluation of Lower back pain that has been ongoing for 9 months without any specific injury or inciting event. Patient has tried Medication, PT,  and Spine Injection outside of [facility] in 2019 which provided relief temporarily. The pain is located primarily on her lower back/buttock radiating to the LEFT leg intermittent and described as burning and sharp. The pain is rated as 10/10 and ranges from 6-10/10. The patient's symptoms are aggravated by going up stairs, and alleviated by movement. The patient works as a Sanitation  which consists of sitting. The patient feels numbness/tingling, but denies any night pain weakness, or bowel/bladder dysfunction.. The patient has no other complaints at this time.\par

## 2021-10-19 NOTE — PHYSICAL EXAM
[FreeTextEntry1] : LUMBAR\par GEN: AAOx3. NAD.\par LS ROM: Flexion full/pain free. Extension/L-oblique extension full (+) axial Sx. \par HIP ROM: Full and pain B/L.\par PALP: (+) TTP L-SIJ. No TTP midline spinous processes, paravertebral muscles, or greater trochanters B/L.\par INSP: Spine alignment is midline, with no evidence of scoliosis.\par STRENGTH: 5/5 bilateral hip flexors, knee extensors, ankle dorsiflexors, long toe extensors, ankle plantar flexors, hip abductors.\par SENS: Grossly intact to LT BLE.\par REFLEXES: Symmetric patella, achilles. \par TONE: Normal, No clonus.\par STANCE: No Trendelenburg with single leg stance.\par GAIT: Non antalgic, normal reciprocating heel to toe\par SPECIAL: SLR and Slump (+) LEFT. FADIR (-) B/L. RUPALI (+) LEFT.

## 2021-10-19 NOTE — ASSESSMENT
[FreeTextEntry1] : Impression:\par 1. LEFT SIJ Pain\par 2. L4/5 Anterolisthesis w/ Facet Arthrosis\par \par Plan: The history and physical examination were reviewed along with available imaging. The imaging and diagnoses were discussed with the patient along with treatment options including physical therapy, oral medication, interventional procedures, and surgery; as well as alternative therapeutics such as acupuncture and massage. We also discussed the importance of weight loss, ergonomics, and posture as they pertain to the current condition as well as overall health and well being. The patient is not currently interested in interventional treatments, and requested an oral steroid taper which had worked for her in the past. She recently did a 2 week course of Prednisone 20mg Daily x 1 week, then 10mg Daily x 1 week which did not work, likely due to underdosing. I provided a Rx for a Decadron Taper along with written instructions. We discussed the medication in detail with regard to appropriate use, adverse effects, and expected outcome. I also recommended that the patient undergo a course of physical therapy. We emphasized the importance of the home exercise program. The patient was educated on red flag symptoms and was instructed to call the office should the current condition worsen or new symptoms develop. The patient will follow up in 6-8 weeks. The patient expressed verbal understanding and is in agreement with the plan of care. All of the patient's questions and concerns were addressed during today's visit.\par

## 2021-11-30 ENCOUNTER — APPOINTMENT (OUTPATIENT)
Dept: PULMONOLOGY | Facility: CLINIC | Age: 56
End: 2021-11-30

## 2021-12-20 ENCOUNTER — APPOINTMENT (OUTPATIENT)
Dept: ORTHOPEDIC SURGERY | Facility: CLINIC | Age: 56
End: 2021-12-20

## 2022-06-03 ENCOUNTER — NON-APPOINTMENT (OUTPATIENT)
Age: 57
End: 2022-06-03

## 2022-06-08 ENCOUNTER — APPOINTMENT (OUTPATIENT)
Dept: ORTHOPEDIC SURGERY | Facility: CLINIC | Age: 57
End: 2022-06-08
Payer: COMMERCIAL

## 2022-06-08 VITALS
WEIGHT: 185 LBS | DIASTOLIC BLOOD PRESSURE: 86 MMHG | SYSTOLIC BLOOD PRESSURE: 129 MMHG | HEIGHT: 58 IN | BODY MASS INDEX: 38.83 KG/M2

## 2022-06-08 DIAGNOSIS — M75.121 COMPLETE ROTATOR CUFF TEAR OR RUPTURE OF RIGHT SHOULDER, NOT SPECIFIED AS TRAUMATIC: ICD-10-CM

## 2022-06-08 DIAGNOSIS — M25.559 PAIN IN UNSPECIFIED HIP: ICD-10-CM

## 2022-06-08 DIAGNOSIS — M25.562 PAIN IN LEFT KNEE: ICD-10-CM

## 2022-06-08 DIAGNOSIS — M25.552 PAIN IN LEFT HIP: ICD-10-CM

## 2022-06-08 DIAGNOSIS — M54.9 DORSALGIA, UNSPECIFIED: ICD-10-CM

## 2022-06-08 PROCEDURE — 73562 X-RAY EXAM OF KNEE 3: CPT | Mod: LT

## 2022-06-08 PROCEDURE — 99214 OFFICE O/P EST MOD 30 MIN: CPT

## 2022-06-08 PROCEDURE — 73502 X-RAY EXAM HIP UNI 2-3 VIEWS: CPT | Mod: LT

## 2022-06-08 PROCEDURE — 72100 X-RAY EXAM L-S SPINE 2/3 VWS: CPT

## 2022-06-08 PROCEDURE — 72040 X-RAY EXAM NECK SPINE 2-3 VW: CPT

## 2022-06-08 NOTE — PHYSICAL EXAM
[de-identified] : Cervical Physical Exam\par \par Gait -antalgic\par \par Station -normal\par \par Sagittal balance -normal\par \par Compensatory mechanism? -None\par \par Horizontal gaze -maintained\par \par Heel walk -unable to do\par \par Toe walk -unable to do\par \par Reflexes\par Biceps -hyperreflexic\par Triceps -hyperreflexic\par Brachioradialis -hyperreflexic\par Patellar -normal\par Gastroc -hyperreflexic\par Clonus -no\par \par Harrison´s -none\par \par Shoulder Exam -within normal limits given patient's history\par \par Spurling´s -none\par \par Wrist Pulses -2+ radial/ulnar\par \par Foot Pulses -2+ DP/PT\par \par Cervical range of motion -globally reduced\par \par Sensation \par C5-T1 sensation intact to light touch bilaterally\par \par L1-S1 sensation intact to light touch bilaterally\par \par Motor\par \par \par 	Deltoid	Biceps	Triceps	WF	WE	IO	\par Right	5/5	3+/5	5/5	4/5	4/5	3+/5	4/5\par Left	5/5	3+/5	5/5	4/5	4/5	3+/5	4/5\par \par \par 	IP	Quad	HS	TA	Gastroc	EHL\par Right	3+/5	5/5	5/5	5/5	5/5	5/5\par Left	3+/5	5/5	5/5	5/5	5/5	5/5\par \par \par  [de-identified] : Cervical radiographs\par Diffuse osteopenia noted\par Severe disc degeneration noted\par Significant facet arthropathy noted\par No brooke instability on flexion-extension radiographs\par \par Lumbar radiographs\par L4-L5 spondylolisthesis noted\par  there is air in the disc space\par  Facet arthropathy\par \par Cervical MRI\par Moderate stenosis noted in subaxial spine\par \par Lumbar MRI\par There are areas of stenosis\par No severe spinal stenosis noted at any level\par \par Cervical radiographs and lumbar radiographs\par No significant interval change on this June 8 x-ray\par There is significant cervical spondylosis and lumbar spondylosis\par Lumbar listhesis noted at L4-L5 as well\par \par Left hip x-ray\par Moderate osteoarthritis noted\par \par Left knee arthritis noted on left knee radiographs

## 2022-06-08 NOTE — REASON FOR VISIT
[Follow-Up Visit] : a follow-up visit for [Back Pain] : back pain [Neck Pain] : neck pain [FreeTextEntry2] : luis antonio, hp

## 2022-06-08 NOTE — HISTORY OF PRESENT ILLNESS
[de-identified] : This is a 56-year-old female with a myriad of complaints today.  She is complaining of back pain, neck pain, left hip pain, left knee pain.  She does state that the symptoms can be severe at times.  It does interfere with her quality life.  It does interfere with her ability to walk.  She denies any bowel bladder issues.  She denies any saddle anesthesia.  She denies any real radiating type symptoms.  She does complain of severe focal left knee pain and left hip pain.

## 2023-09-13 ENCOUNTER — APPOINTMENT (OUTPATIENT)
Dept: ORTHOPEDIC SURGERY | Facility: CLINIC | Age: 58
End: 2023-09-13
Payer: COMMERCIAL

## 2023-09-13 VITALS — WEIGHT: 190 LBS | HEIGHT: 58 IN | BODY MASS INDEX: 39.88 KG/M2

## 2023-09-13 DIAGNOSIS — M75.122 COMPLETE ROTATOR CUFF TEAR OR RUPTURE OF LEFT SHOULDER, NOT SPECIFIED AS TRAUMATIC: ICD-10-CM

## 2023-09-13 DIAGNOSIS — M17.0 BILATERAL PRIMARY OSTEOARTHRITIS OF KNEE: ICD-10-CM

## 2023-09-13 PROCEDURE — 99205 OFFICE O/P NEW HI 60 MIN: CPT

## 2023-09-27 ENCOUNTER — EMERGENCY (EMERGENCY)
Facility: HOSPITAL | Age: 58
LOS: 1 days | Discharge: HOME | End: 2023-09-27
Attending: STUDENT IN AN ORGANIZED HEALTH CARE EDUCATION/TRAINING PROGRAM
Payer: COMMERCIAL

## 2023-09-27 VITALS
TEMPERATURE: 100 F | OXYGEN SATURATION: 97 % | SYSTOLIC BLOOD PRESSURE: 120 MMHG | DIASTOLIC BLOOD PRESSURE: 83 MMHG | RESPIRATION RATE: 18 BRPM | HEIGHT: 58 IN | HEART RATE: 89 BPM | WEIGHT: 190.04 LBS

## 2023-09-27 DIAGNOSIS — Z98.84 BARIATRIC SURGERY STATUS: Chronic | ICD-10-CM

## 2023-09-27 DIAGNOSIS — Z98.890 OTHER SPECIFIED POSTPROCEDURAL STATES: Chronic | ICD-10-CM

## 2023-09-27 LAB
ALBUMIN SERPL ELPH-MCNC: 3.3 G/DL — LOW (ref 3.5–5)
ALP SERPL-CCNC: 70 U/L — SIGNIFICANT CHANGE UP (ref 40–120)
ALT FLD-CCNC: 23 U/L DA — SIGNIFICANT CHANGE UP (ref 10–60)
ANION GAP SERPL CALC-SCNC: 7 MMOL/L — SIGNIFICANT CHANGE UP (ref 5–17)
APTT BLD: 39.4 SEC — HIGH (ref 24.5–35.6)
AST SERPL-CCNC: 19 U/L — SIGNIFICANT CHANGE UP (ref 10–40)
BASOPHILS # BLD AUTO: 0.01 K/UL — SIGNIFICANT CHANGE UP (ref 0–0.2)
BASOPHILS NFR BLD AUTO: 0.2 % — SIGNIFICANT CHANGE UP (ref 0–2)
BILIRUB SERPL-MCNC: 0.2 MG/DL — SIGNIFICANT CHANGE UP (ref 0.2–1.2)
BUN SERPL-MCNC: 10 MG/DL — SIGNIFICANT CHANGE UP (ref 7–18)
CALCIUM SERPL-MCNC: 9.1 MG/DL — SIGNIFICANT CHANGE UP (ref 8.4–10.5)
CHLORIDE SERPL-SCNC: 107 MMOL/L — SIGNIFICANT CHANGE UP (ref 96–108)
CO2 SERPL-SCNC: 26 MMOL/L — SIGNIFICANT CHANGE UP (ref 22–31)
CREAT SERPL-MCNC: 0.69 MG/DL — SIGNIFICANT CHANGE UP (ref 0.5–1.3)
EGFR: 100 ML/MIN/1.73M2 — SIGNIFICANT CHANGE UP
EOSINOPHIL # BLD AUTO: 0 K/UL — SIGNIFICANT CHANGE UP (ref 0–0.5)
EOSINOPHIL NFR BLD AUTO: 0 % — SIGNIFICANT CHANGE UP (ref 0–6)
GLUCOSE SERPL-MCNC: 114 MG/DL — HIGH (ref 70–99)
HCT VFR BLD CALC: 38.2 % — SIGNIFICANT CHANGE UP (ref 34.5–45)
HGB BLD-MCNC: 11.4 G/DL — LOW (ref 11.5–15.5)
IMM GRANULOCYTES NFR BLD AUTO: 0.2 % — SIGNIFICANT CHANGE UP (ref 0–0.9)
INR BLD: 1.06 RATIO — SIGNIFICANT CHANGE UP (ref 0.85–1.18)
LYMPHOCYTES # BLD AUTO: 1.67 K/UL — SIGNIFICANT CHANGE UP (ref 1–3.3)
LYMPHOCYTES # BLD AUTO: 33.5 % — SIGNIFICANT CHANGE UP (ref 13–44)
MCHC RBC-ENTMCNC: 24.1 PG — LOW (ref 27–34)
MCHC RBC-ENTMCNC: 29.8 GM/DL — LOW (ref 32–36)
MCV RBC AUTO: 80.8 FL — SIGNIFICANT CHANGE UP (ref 80–100)
MONOCYTES # BLD AUTO: 0.41 K/UL — SIGNIFICANT CHANGE UP (ref 0–0.9)
MONOCYTES NFR BLD AUTO: 8.2 % — SIGNIFICANT CHANGE UP (ref 2–14)
NEUTROPHILS # BLD AUTO: 2.88 K/UL — SIGNIFICANT CHANGE UP (ref 1.8–7.4)
NEUTROPHILS NFR BLD AUTO: 57.9 % — SIGNIFICANT CHANGE UP (ref 43–77)
NRBC # BLD: 0 /100 WBCS — SIGNIFICANT CHANGE UP (ref 0–0)
NT-PROBNP SERPL-SCNC: 127 PG/ML — HIGH (ref 0–125)
PLATELET # BLD AUTO: 242 K/UL — SIGNIFICANT CHANGE UP (ref 150–400)
POTASSIUM SERPL-MCNC: 4.2 MMOL/L — SIGNIFICANT CHANGE UP (ref 3.5–5.3)
POTASSIUM SERPL-SCNC: 4.2 MMOL/L — SIGNIFICANT CHANGE UP (ref 3.5–5.3)
PROT SERPL-MCNC: 7.6 G/DL — SIGNIFICANT CHANGE UP (ref 6–8.3)
PROTHROM AB SERPL-ACNC: 12.1 SEC — SIGNIFICANT CHANGE UP (ref 9.5–13)
RBC # BLD: 4.73 M/UL — SIGNIFICANT CHANGE UP (ref 3.8–5.2)
RBC # FLD: 15 % — HIGH (ref 10.3–14.5)
SODIUM SERPL-SCNC: 140 MMOL/L — SIGNIFICANT CHANGE UP (ref 135–145)
TROPONIN I, HIGH SENSITIVITY RESULT: 23.3 NG/L — SIGNIFICANT CHANGE UP
WBC # BLD: 4.98 K/UL — SIGNIFICANT CHANGE UP (ref 3.8–10.5)
WBC # FLD AUTO: 4.98 K/UL — SIGNIFICANT CHANGE UP (ref 3.8–10.5)

## 2023-09-27 PROCEDURE — 99285 EMERGENCY DEPT VISIT HI MDM: CPT | Mod: 25

## 2023-09-27 PROCEDURE — 99284 EMERGENCY DEPT VISIT MOD MDM: CPT

## 2023-09-27 PROCEDURE — 71045 X-RAY EXAM CHEST 1 VIEW: CPT

## 2023-09-27 PROCEDURE — 83880 ASSAY OF NATRIURETIC PEPTIDE: CPT

## 2023-09-27 PROCEDURE — 80053 COMPREHEN METABOLIC PANEL: CPT

## 2023-09-27 PROCEDURE — 93005 ELECTROCARDIOGRAM TRACING: CPT

## 2023-09-27 PROCEDURE — 94640 AIRWAY INHALATION TREATMENT: CPT

## 2023-09-27 PROCEDURE — 71045 X-RAY EXAM CHEST 1 VIEW: CPT | Mod: 26

## 2023-09-27 PROCEDURE — 85610 PROTHROMBIN TIME: CPT

## 2023-09-27 PROCEDURE — 84484 ASSAY OF TROPONIN QUANT: CPT

## 2023-09-27 PROCEDURE — 36415 COLL VENOUS BLD VENIPUNCTURE: CPT

## 2023-09-27 PROCEDURE — 85025 COMPLETE CBC W/AUTO DIFF WBC: CPT

## 2023-09-27 PROCEDURE — 85730 THROMBOPLASTIN TIME PARTIAL: CPT

## 2023-09-27 RX ORDER — IBUPROFEN 200 MG
400 TABLET ORAL ONCE
Refills: 0 | Status: COMPLETED | OUTPATIENT
Start: 2023-09-27 | End: 2023-09-27

## 2023-09-27 RX ORDER — METHYLPREDNISOLONE 4 MG/1
4 TABLET ORAL
Qty: 1 | Refills: 0 | Status: ACTIVE | COMMUNITY
Start: 2023-09-27 | End: 1900-01-01

## 2023-09-27 RX ORDER — IPRATROPIUM/ALBUTEROL SULFATE 18-103MCG
3 AEROSOL WITH ADAPTER (GRAM) INHALATION ONCE
Refills: 0 | Status: COMPLETED | OUTPATIENT
Start: 2023-09-27 | End: 2023-09-27

## 2023-09-27 RX ORDER — SODIUM CHLORIDE 9 MG/ML
1000 INJECTION, SOLUTION INTRAVENOUS ONCE
Refills: 0 | Status: COMPLETED | OUTPATIENT
Start: 2023-09-27 | End: 2023-09-27

## 2023-09-27 RX ORDER — PSEUDOEPHEDRINE HCL 30 MG
30 TABLET ORAL ONCE
Refills: 0 | Status: COMPLETED | OUTPATIENT
Start: 2023-09-27 | End: 2023-09-27

## 2023-09-27 RX ORDER — MELOXICAM 15 MG/1
15 TABLET ORAL
Qty: 30 | Refills: 0 | Status: ACTIVE | COMMUNITY
Start: 2023-09-13 | End: 1900-01-01

## 2023-09-27 RX ORDER — SODIUM CHLORIDE 9 MG/ML
1000 INJECTION INTRAMUSCULAR; INTRAVENOUS; SUBCUTANEOUS ONCE
Refills: 0 | Status: DISCONTINUED | OUTPATIENT
Start: 2023-09-27 | End: 2023-09-27

## 2023-09-27 RX ADMIN — Medication 3 MILLILITER(S): at 21:29

## 2023-09-27 RX ADMIN — SODIUM CHLORIDE 1000 MILLILITER(S): 9 INJECTION, SOLUTION INTRAVENOUS at 21:29

## 2023-09-27 RX ADMIN — Medication 400 MILLIGRAM(S): at 21:28

## 2023-09-27 RX ADMIN — Medication 100 MILLIGRAM(S): at 21:28

## 2023-09-27 RX ADMIN — Medication 400 MILLIGRAM(S): at 21:58

## 2023-09-27 RX ADMIN — Medication 30 MILLIGRAM(S): at 21:28

## 2023-09-27 NOTE — ED PROVIDER NOTE - PATIENT PORTAL LINK FT
You can access the FollowMyHealth Patient Portal offered by [facility] by registering at the following website: [email or web address]. By joining [employer]’s FollowMyHealth portal, you will also be able to view your health information using other applications (apps) compatible with our system.

## 2023-09-27 NOTE — ED PROVIDER NOTE - PROGRESS NOTE DETAILS
Prasad-DO: pt seen and re-evaluated at bedside.  Pt states their symptoms have improved.  Pt comfortable in NAD.  Labs/imaging non-actionable. Patient tolerating PO intake. Discussed PMD follow up, return precautions, pt understood and agreeable with plan.

## 2023-09-27 NOTE — ED PROVIDER NOTE - CLINICAL SUMMARY MEDICAL DECISION MAKING FREE TEXT BOX
Celso: 58-year-old female with past medical history hypertension, hyperlipidemia, diabetes, GERD, asthma presents with flulike symptoms since yesterday.  Patient states she had exposure to COVID 3 days ago, states since yesterday she having fevers, chills, nasal congestion, sore throat, cough, and chest congestion.  Denies any chest pain, difficulty breathing, vomiting, diarrhea, dysuria, numbness, focal weakness, or rash.  Patient states she tested positive for COVID yesterday.  Patient seen urgent care this morning and sent to the emergency department for further evaluation. Patient states she is fully vaccinated and had COVID in the past. Physical exam per above. Patient well appearing, saturating well on RA, not requiring supplemental oxygen. Will obtain labs, imaging, provide supportive treatment with dispo pending workup.

## 2023-09-27 NOTE — ED PROVIDER NOTE - NSFOLLOWUPINSTRUCTIONS_ED_ALL_ED_FT
You should self-quarantine for presumed COVID. Please see the attached COVID information packet for management of your symptoms, and more information on the next steps including your self-quarantine measures. Remaining self-quarantined is crucial to limiting the spread of the virus.  You may also refer to the CDC website for more information: https://www.cdc.gov/coronavirus/2019-ncov/if-you-are-sick/steps-when-sick.html.      - Do NOT go to work, school or public areas.  Avoid using public transportation, ride sharing or taxis.  - Wear a mask whenever you are around other people.  - Avoid sharing personal household items.  You should NOT share dishes, drinking glasses, cups, eating utensils, towels or bedding with other people or pets in your home.  After using these items they should be washed thoroughly.    - Avoid touching your face including your eyes, nose and mouth with your hands.  - Wash your hands often with soap and water for at least 20 seconds.  You can also clean your hands with an alcohol based  that contains at least 60-95% alcohol.  You should wash/clean all surfaces of your hands.  Use soap and water preferentially.    At this time your clinical evaluation and history do not demonstrate any acute, life-threatening medical conditions warranting emergent treatment.     Continue to maintain oral hydration with plenty of fluids.  You may take Tylenol (Acetaminophen) every 8 hours with food (following the instructions on the medication insert information sheet for dosing) for fever control.  Do not exceed 3000 mg in 24 hours of acetaminophen (Tylenol).      Take prescription tessalon up to 3 times a day as needed for cough.    Take prescription pseudoephedrine every 6 hours as needed for congestion.      Should you develop new or worsening symptoms including but not limited to chest pain, shortness of breath, fainting - please return to the ED for immediate evaluation.

## 2023-09-27 NOTE — ED PROVIDER NOTE - NSICDXFAMILYHX_GEN_ALL_CORE_FT
Pt. In bed resting. Drowsy and periodically confused. Breathing unlabored. Breath sounds diminished. Family at bedside. Call light within reach, bed low and locked with safety measures in place. FAMILY HISTORY:  Family history of diabetes mellitus, mother

## 2023-09-27 NOTE — ED ADULT NURSE NOTE - OBJECTIVE STATEMENT
Patient reports Covid 19 + since today, c/o body ache, chills, sore throat, h/x COPD, Asthma. Patient denies SOB or chest pain.

## 2023-09-27 NOTE — ED ADULT NURSE NOTE - ED STAT RN HANDOFF DETAILS
Patient verbalized understanding D/C instructions to: Complete quarantine Follow up with PCP & return for sudden or worsening symptoms. IV removed no redness or swelling noted. Catheter intact, pressure dressing applied.

## 2023-09-27 NOTE — ED ADULT NURSE NOTE - NSFALLUNIVINTERV_ED_ALL_ED
Bed/Stretcher in lowest position, wheels locked, appropriate side rails in place/Call bell, personal items and telephone in reach/Instruct patient to call for assistance before getting out of bed/chair/stretcher/Non-slip footwear applied when patient is off stretcher/Echo to call system/Physically safe environment - no spills, clutter or unnecessary equipment/Purposeful proactive rounding/Room/bathroom lighting operational, light cord in reach

## 2023-09-27 NOTE — ED PROVIDER NOTE - OBJECTIVE STATEMENT
58-year-old female with past medical history hypertension, hyperlipidemia, diabetes, GERD, asthma presents with flulike symptoms since yesterday.  Patient states she had exposure to COVID 3 days ago, states since yesterday she having fevers, chills, nasal congestion, sore throat, cough, and chest congestion.  Denies any chest pain, difficulty breathing, vomiting, diarrhea, dysuria, numbness, focal weakness, or rash.  Patient states she tested positive for COVID yesterday.  Patient seen urgent care this morning and sent to the emergency department for further evaluation. Patient states she is fully vaccinated and had COVID in the past. Denies any additional complaints.

## 2023-09-27 NOTE — ED PROVIDER NOTE - PHYSICAL EXAMINATION
CONSTITUTIONAL: non-toxic, well appearing  SKIN: no rash, no petechiae.  EYES: pink conjunctiva, anicteric  ENT: tongue and uvular midline, no exudates, mildly dry mucous membranes  NECK: Supple; no meningismus, no JVD  CARD: RRR, no murmurs, equal radial pulses bilaterally 2+  RESP: CTAB, no respiratory distress  ABD: Soft, non-tender, non-distended, no peritoneal signs  EXT: Normal ROM x4. No edema.   NEURO: Alert, oriented. Neuro exam nonfocal  PSYCH: Cooperative, appropriate.

## 2023-09-28 VITALS
HEART RATE: 77 BPM | RESPIRATION RATE: 18 BRPM | TEMPERATURE: 99 F | OXYGEN SATURATION: 96 % | DIASTOLIC BLOOD PRESSURE: 74 MMHG | SYSTOLIC BLOOD PRESSURE: 110 MMHG

## 2023-09-28 RX ORDER — ACETAMINOPHEN 500 MG
2 TABLET ORAL
Qty: 40 | Refills: 0
Start: 2023-09-28

## 2023-09-28 RX ORDER — PSEUDOEPHEDRINE HCL 30 MG
1 TABLET ORAL
Qty: 20 | Refills: 0
Start: 2023-09-28

## 2024-09-17 ENCOUNTER — APPOINTMENT (OUTPATIENT)
Dept: SURGICAL ONCOLOGY | Facility: CLINIC | Age: 59
End: 2024-09-17

## 2024-09-17 ENCOUNTER — RESULT REVIEW (OUTPATIENT)
Age: 59
End: 2024-09-17

## 2024-09-17 PROCEDURE — 19083 BX BREAST 1ST LESION US IMAG: CPT | Mod: 50

## 2024-09-17 PROCEDURE — 99205 OFFICE O/P NEW HI 60 MIN: CPT | Mod: 25

## 2024-10-01 ENCOUNTER — APPOINTMENT (OUTPATIENT)
Dept: SURGICAL ONCOLOGY | Facility: CLINIC | Age: 59
End: 2024-10-01

## 2024-10-01 PROCEDURE — 99214 OFFICE O/P EST MOD 30 MIN: CPT

## 2024-10-03 DIAGNOSIS — C50.919 MALIGNANT NEOPLASM OF UNSPECIFIED SITE OF UNSPECIFIED FEMALE BREAST: ICD-10-CM

## 2024-10-04 ENCOUNTER — TRANSCRIPTION ENCOUNTER (OUTPATIENT)
Age: 59
End: 2024-10-04

## 2024-10-23 ENCOUNTER — OUTPATIENT (OUTPATIENT)
Dept: OUTPATIENT SERVICES | Facility: HOSPITAL | Age: 59
LOS: 1 days | End: 2024-10-23
Payer: COMMERCIAL

## 2024-10-23 VITALS
OXYGEN SATURATION: 98 % | HEART RATE: 88 BPM | RESPIRATION RATE: 18 BRPM | SYSTOLIC BLOOD PRESSURE: 139 MMHG | HEIGHT: 58 IN | TEMPERATURE: 98 F | WEIGHT: 190.04 LBS | DIASTOLIC BLOOD PRESSURE: 79 MMHG

## 2024-10-23 DIAGNOSIS — Z98.890 OTHER SPECIFIED POSTPROCEDURAL STATES: Chronic | ICD-10-CM

## 2024-10-23 DIAGNOSIS — C50.911 MALIGNANT NEOPLASM OF UNSPECIFIED SITE OF RIGHT FEMALE BREAST: ICD-10-CM

## 2024-10-23 DIAGNOSIS — I10 ESSENTIAL (PRIMARY) HYPERTENSION: ICD-10-CM

## 2024-10-23 DIAGNOSIS — G47.33 OBSTRUCTIVE SLEEP APNEA (ADULT) (PEDIATRIC): ICD-10-CM

## 2024-10-23 DIAGNOSIS — K21.9 GASTRO-ESOPHAGEAL REFLUX DISEASE WITHOUT ESOPHAGITIS: ICD-10-CM

## 2024-10-23 DIAGNOSIS — Z98.84 BARIATRIC SURGERY STATUS: Chronic | ICD-10-CM

## 2024-10-23 DIAGNOSIS — J45.909 UNSPECIFIED ASTHMA, UNCOMPLICATED: ICD-10-CM

## 2024-10-23 DIAGNOSIS — Z01.818 ENCOUNTER FOR OTHER PREPROCEDURAL EXAMINATION: ICD-10-CM

## 2024-10-23 DIAGNOSIS — Z90.711 ACQUIRED ABSENCE OF UTERUS WITH REMAINING CERVICAL STUMP: Chronic | ICD-10-CM

## 2024-10-23 LAB
ANION GAP SERPL CALC-SCNC: 8 MMOL/L — SIGNIFICANT CHANGE UP (ref 5–17)
BUN SERPL-MCNC: 19 MG/DL — HIGH (ref 7–18)
CALCIUM SERPL-MCNC: 9.8 MG/DL — SIGNIFICANT CHANGE UP (ref 8.4–10.5)
CHLORIDE SERPL-SCNC: 105 MMOL/L — SIGNIFICANT CHANGE UP (ref 96–108)
CO2 SERPL-SCNC: 26 MMOL/L — SIGNIFICANT CHANGE UP (ref 22–31)
CREAT SERPL-MCNC: 1.09 MG/DL — SIGNIFICANT CHANGE UP (ref 0.5–1.3)
EGFR: 58 ML/MIN/1.73M2 — LOW
GLUCOSE SERPL-MCNC: 138 MG/DL — HIGH (ref 70–99)
HCT VFR BLD CALC: 38.3 % — SIGNIFICANT CHANGE UP (ref 34.5–45)
HGB BLD-MCNC: 11.2 G/DL — LOW (ref 11.5–15.5)
MCHC RBC-ENTMCNC: 23.3 PG — LOW (ref 27–34)
MCHC RBC-ENTMCNC: 29.2 GM/DL — LOW (ref 32–36)
MCV RBC AUTO: 79.8 FL — LOW (ref 80–100)
NRBC # BLD: 0 /100 WBCS — SIGNIFICANT CHANGE UP (ref 0–0)
PLATELET # BLD AUTO: 390 K/UL — SIGNIFICANT CHANGE UP (ref 150–400)
POTASSIUM SERPL-MCNC: 4.1 MMOL/L — SIGNIFICANT CHANGE UP (ref 3.5–5.3)
POTASSIUM SERPL-SCNC: 4.1 MMOL/L — SIGNIFICANT CHANGE UP (ref 3.5–5.3)
RBC # BLD: 4.8 M/UL — SIGNIFICANT CHANGE UP (ref 3.8–5.2)
RBC # FLD: 15.7 % — HIGH (ref 10.3–14.5)
SODIUM SERPL-SCNC: 139 MMOL/L — SIGNIFICANT CHANGE UP (ref 135–145)
WBC # BLD: 8.72 K/UL — SIGNIFICANT CHANGE UP (ref 3.8–10.5)
WBC # FLD AUTO: 8.72 K/UL — SIGNIFICANT CHANGE UP (ref 3.8–10.5)

## 2024-10-23 NOTE — H&P PST ADULT - NEGATIVE GENERAL SYMPTOMS
"Speech Language Pathology   Cognitive Evaluation     Patient Name: Anita Beard  AGE:  85 y.o., SEX:  female  Medical Record #: [id number]  Date of Service: 11/7/2023      History of Present Illness  86 y/o female presented 11/3 from outside facility for SDH. R craniotomy 11/4.     CMHx: SDH, COPD, age-related physical debility, AMS, leukocyotosis  PMHx: LINUS, depression, afib, HFpEF    CT Head w/o 11/4:  \"Postsurgical change from right frontal craniotomy and evacuation of the right subdural hematoma.  Postsurgical pneumocephalus and residual blood seen in the right subdural space.  Improved right to left midline shift of 3 mm, previously 6 mm.\"      General Information  Vitals  O2 (LPM): 3  O2 Delivery Device: Silicone Nasal Cannula  Level of Consciousness: Alert  Orientation: Disoriented x 4  Follows Directives: Yes      Prior Living Situation & Level of Function  Prior Services: None, Home-Independent  Housing / Facility: Motor Home  Lives with - Patient's Self Care Capacity: Alone and Able to Care For Self, Adult Children  Comments: Lives alone in a 5th wheel on daughters property (100 yards from house     Communication: Declining memory and problem solving in the month prior to admission likely related to developing SDH  Swallowing: WNL       Oral Mechanism Evaluation  Dentition: Good  Facial Symmetry: Equal  Facial Sensation: Equal  Labial Observations: WFL  Lingual Observations: Midline  Motor Speech: WFL      Laryngeal Function Exam  Secretion Management: Adequate  Voice Quality: WFL  Cough: Perceptually WNL      Subjective  Patient is pleasant and agreeable, daughter at bedside      Communication Domain(s)  Expressive Language: WFL  Receptive Language: WFL  Cognitive-Linguistic: Mild-Moderate Impairment     Portions of the COGNISTAT (Neurobehavioral Cognitive Status Assessment) were administered in conjunction with non-standardized assessments. Results are outlined below:        Orientation: Average  -Answers " 3/3 questions accurately regarding person.  -Answers 3/3 questions accurately regarding place.  -Answers 2/3 questions accurately regarding time.    Attention: Average  -Repeats 6 digits forward in 1/1 trials    Comprehension: Average  -Follows 3 step commands accurately in 2/2 trials    Repetition: Average  -Repeats 5-7 word sentences accurately    Memory: Average  -Immediate recall: 5 trials to recall 4 unrelated words  -Delayed recall: after 5 minutes, patient recalled 3/4 unrelated words without cues, improved to 4/4 with category cue. Performance indicates new learning to be relatively challenging, however, relative strength in delayed recall once information has been imbedded.     Calculations: Moderate  -Completes 1/2 addition calculations accurately  -Completes 0/1 multiplication calculations accurately  Comments: impairments in working memory impacted ability to complete calculations    Similarities: Average  -Identifies how abstract reason for similarities between nouns in 1/1 opportunities    Judgement: Average  -1/1 accuracy on verbal reasoning/judgement tasks      Clinical Impressions  Patient presents with mild-moderate cognitive linguistic impairment characterized by primary impairments in working memory and new learning. Difficulty retaining and manipulating information for calculation tasks was challenging for patient as well as embedding new information for new learning on list recall tasks. Relative strengths in delayed recall after training for embedding new information noted. Excellent language skills demonstrated during evaluation. Based on performance today, patient would benefit from discharge to an environment that can provide assist for and support gradual return to independent completion of IADLs such as medication management and finances. Family reports they are able to provide this. Patient is also a good candidate for continued cognitive communication therapy after discharge. Will  follow while in house.      NOTE: It is not within the scope of practice of Speech-Language Pathologists to determine patient capacity. Please defer to the physician or psych to complete this assessment.       Recommendations  Supervision Needs Upons Discharge: Intermittent assistance with IADLs (see below)  IADLs: Medication management, Financial management, Appointment management         SLP Treatment Plan  Treatment Plan: Cognitive Treatment  SLP Frequency: 2x Per Week  Estimated Duration: Until Therapy Goals Met      Anticipated Discharge Needs  Discharge Recommendations: Recommend outpatient speech therapy services  Therapy Recommendations Upon DC: Cognitive-Linguistic Training      Patient / Family Goals  Patient / Family Goal #1: To eat and drink  Goal #1 Outcome: Goal met  Short Term Goal # 1: Patient will use external memory aids to independently recall functional information independently.  Short Term Goal # 2: Patient will participate in assessment of reading and writing abilities.      Debbie Avila, SLP   no fever/no chills

## 2024-10-23 NOTE — H&P PST ADULT - NSICDXPASTMEDICALHX_GEN_ALL_CORE_FT
PAST MEDICAL HISTORY:  2019 novel coronavirus disease (COVID-19)     Anxiety and depression     Asthma     DM2 (diabetes mellitus, type 2) borderline DM - not on meds    Fibromyalgia     Former light tobacco smoker     [place]     History of cataract     History of gastroesophageal reflux (GERD)     History of hemorrhoids     History of hiatal hernia     History of IBS     History of sciatica     HLD (hyperlipidemia) not on meds    HTN (hypertension)     Leiomyoma of uterus, unspecified     Migraine headache     OA (osteoarthritis)     Obstructive sleep apnea     Prediabetes     Umbilical hernia

## 2024-10-23 NOTE — H&P PST ADULT - NSICDXPROCEDURE_GEN_ALL_CORE_FT
PROCEDURES:  Lumpectomy of right breast after needle localization 23-Oct-2024 17:21:23  Shira Echols  Breast lumpectomy with sentinel node biopsy 23-Oct-2024 17:22:05  Shira Echols

## 2024-10-23 NOTE — H&P PST ADULT - PROBLEM SELECTOR PLAN 2
Patient with hx of ELINOR on CPAP (complaint)  Continue CPAP as titrated at home  Perioperative ELINOR precautions to be maintained.   Discussed 3hr PACU monitoring and plan of care with patient, agrees    Advised to bring CPAP machine on day of surgery to facilitate reinstitution of therapy postoperatively

## 2024-10-23 NOTE — H&P PST ADULT - PROBLEM SELECTOR PLAN 3
Current treatment regimen - Valsartan/HCTZ 320/12.5mg daily   Advised to continue regimen  Patient advised with understanding verbalized to take Valsartan/HCTZ with a sip of water the morning of surgery.   Follow up with PCP/Cardiologist postoperatively

## 2024-10-23 NOTE — H&P PST ADULT - PROBLEM SELECTOR PLAN 4
Current treatment regimen - Albuterol inhalers PRN   Patient advised with understanding verbalized to use albuterol the morning of surgery.   Follow up with PCP/Pulmonologist postoperatively

## 2024-10-23 NOTE — H&P PST ADULT - NSICDXPASTSURGICALHX_GEN_ALL_CORE_FT
PAST SURGICAL HISTORY:  H/O right inguinal hernia repair     History of lithotripsy     History of partial hysterectomy     S/P gastric bypass 2018    S/P hernia surgery

## 2024-10-23 NOTE — H&P PST ADULT - PROBLEM SELECTOR PLAN 5
Current treatment regimen - Omeprazole 20mg daily. Advised to continue regimen  Patient advised with understanding verbalized to take Omeprazole with a sip of water the morning of surgery.   Follow up with PCP/Gastroenterologist postoperatively

## 2024-10-23 NOTE — H&P PST ADULT - PROBLEM SELECTOR PLAN 1
Patient is scheduled for right breast magseed localized lumpectomy with sentinel node biopsy on 10/28/2024  Written and oral preoperative instructions given to patient with understanding verbalized.    Instructions given to include using 4% chlorhexidine wash as directed starting 3days before day of surgery (inclusive of day of surgery)   Maintaining NPO status post midnight day before surgery   Stopping aspirin, NSAIDs, herbs, vitamins 7days before surgery    Patient is to expect a phone call day before surgery between 430-630pm, giving arrival time for surgery day.    Preop labs drawn today, results pending

## 2024-10-24 PROCEDURE — 85027 COMPLETE CBC AUTOMATED: CPT

## 2024-10-24 PROCEDURE — G0463: CPT

## 2024-10-24 PROCEDURE — 36415 COLL VENOUS BLD VENIPUNCTURE: CPT

## 2024-10-24 PROCEDURE — 80048 BASIC METABOLIC PNL TOTAL CA: CPT

## 2024-10-25 ENCOUNTER — APPOINTMENT (OUTPATIENT)
Dept: ULTRASOUND IMAGING | Facility: CLINIC | Age: 59
End: 2024-10-25
Payer: COMMERCIAL

## 2024-10-25 ENCOUNTER — APPOINTMENT (OUTPATIENT)
Dept: MAMMOGRAPHY | Facility: CLINIC | Age: 59
End: 2024-10-25
Payer: COMMERCIAL

## 2024-10-25 PROCEDURE — 19285Z: CUSTOM | Mod: RT

## 2024-10-25 PROCEDURE — A4648: CPT

## 2024-10-26 PROBLEM — D25.9 LEIOMYOMA OF UTERUS, UNSPECIFIED: Chronic | Status: ACTIVE | Noted: 2024-10-23

## 2024-10-26 PROBLEM — M19.90 UNSPECIFIED OSTEOARTHRITIS, UNSPECIFIED SITE: Chronic | Status: ACTIVE | Noted: 2024-10-23

## 2024-10-26 PROBLEM — Z87.19 PERSONAL HISTORY OF OTHER DISEASES OF THE DIGESTIVE SYSTEM: Chronic | Status: ACTIVE | Noted: 2024-10-23

## 2024-10-26 PROBLEM — K42.9 UMBILICAL HERNIA WITHOUT OBSTRUCTION OR GANGRENE: Chronic | Status: ACTIVE | Noted: 2024-10-23

## 2024-10-26 PROBLEM — R73.03 PREDIABETES: Chronic | Status: ACTIVE | Noted: 2024-10-23

## 2024-10-26 PROBLEM — U07.1 COVID-19: Chronic | Status: ACTIVE | Noted: 2024-10-23

## 2024-10-26 PROBLEM — Z87.891 PERSONAL HISTORY OF NICOTINE DEPENDENCE: Chronic | Status: ACTIVE | Noted: 2024-10-23

## 2024-10-26 PROBLEM — Z86.69 PERSONAL HISTORY OF OTHER DISEASES OF THE NERVOUS SYSTEM AND SENSE ORGANS: Chronic | Status: ACTIVE | Noted: 2024-10-23

## 2024-10-26 PROBLEM — G43.909 MIGRAINE, UNSPECIFIED, NOT INTRACTABLE, WITHOUT STATUS MIGRAINOSUS: Chronic | Status: ACTIVE | Noted: 2024-10-23

## 2024-10-26 PROBLEM — F41.9 ANXIETY DISORDER, UNSPECIFIED: Chronic | Status: ACTIVE | Noted: 2024-10-23

## 2024-10-26 PROBLEM — M77.30 CALCANEAL SPUR, UNSPECIFIED FOOT: Chronic | Status: ACTIVE | Noted: 2024-10-23

## 2024-10-28 ENCOUNTER — RESULT REVIEW (OUTPATIENT)
Age: 59
End: 2024-10-28

## 2024-10-28 ENCOUNTER — INPATIENT (INPATIENT)
Facility: HOSPITAL | Age: 59
LOS: 0 days | Discharge: HOME | DRG: 581 | End: 2024-10-29
Attending: SPECIALIST | Admitting: SPECIALIST
Payer: COMMERCIAL

## 2024-10-28 ENCOUNTER — APPOINTMENT (OUTPATIENT)
Dept: SURGICAL ONCOLOGY | Facility: HOSPITAL | Age: 59
End: 2024-10-28

## 2024-10-28 ENCOUNTER — TRANSCRIPTION ENCOUNTER (OUTPATIENT)
Age: 59
End: 2024-10-28

## 2024-10-28 VITALS
RESPIRATION RATE: 16 BRPM | HEIGHT: 58 IN | HEART RATE: 96 BPM | OXYGEN SATURATION: 98 % | DIASTOLIC BLOOD PRESSURE: 74 MMHG | WEIGHT: 190.04 LBS | SYSTOLIC BLOOD PRESSURE: 120 MMHG | TEMPERATURE: 98 F

## 2024-10-28 DIAGNOSIS — Z98.890 OTHER SPECIFIED POSTPROCEDURAL STATES: Chronic | ICD-10-CM

## 2024-10-28 DIAGNOSIS — Z90.711 ACQUIRED ABSENCE OF UTERUS WITH REMAINING CERVICAL STUMP: Chronic | ICD-10-CM

## 2024-10-28 DIAGNOSIS — C50.911 MALIGNANT NEOPLASM OF UNSPECIFIED SITE OF RIGHT FEMALE BREAST: ICD-10-CM

## 2024-10-28 DIAGNOSIS — Z98.84 BARIATRIC SURGERY STATUS: Chronic | ICD-10-CM

## 2024-10-28 DIAGNOSIS — Z01.818 ENCOUNTER FOR OTHER PREPROCEDURAL EXAMINATION: ICD-10-CM

## 2024-10-28 PROCEDURE — 38792 RA TRACER ID OF SENTINL NODE: CPT | Mod: RT

## 2024-10-28 PROCEDURE — 76098 X-RAY EXAM SURGICAL SPECIMEN: CPT | Mod: 26

## 2024-10-28 PROCEDURE — 19302 P-MASTECTOMY W/LN REMOVAL: CPT | Mod: RT

## 2024-10-28 PROCEDURE — 14001 TIS TRNFR TRUNK 10.1-30SQCM: CPT

## 2024-10-28 PROCEDURE — 88305 TISSUE EXAM BY PATHOLOGIST: CPT | Mod: 26

## 2024-10-28 PROCEDURE — 88307 TISSUE EXAM BY PATHOLOGIST: CPT | Mod: 26

## 2024-10-28 RX ORDER — KETOROLAC TROMETHAMINE 30 MG/ML
15 INJECTION INTRAMUSCULAR; INTRAVENOUS EVERY 6 HOURS
Refills: 0 | Status: DISCONTINUED | OUTPATIENT
Start: 2024-10-28 | End: 2024-10-29

## 2024-10-28 RX ORDER — MELOXICAM 7.5 MG
1 TABLET ORAL
Refills: 0 | DISCHARGE

## 2024-10-28 RX ORDER — TAMSULOSIN HCL 0.4 MG
1 CAPSULE ORAL
Refills: 0 | DISCHARGE

## 2024-10-28 RX ORDER — HYDROMORPHONE HCL/0.9% NACL/PF 6 MG/30 ML
0.5 PATIENT CONTROLLED ANALGESIA SYRINGE INTRAVENOUS
Refills: 0 | Status: DISCONTINUED | OUTPATIENT
Start: 2024-10-28 | End: 2024-10-28

## 2024-10-28 RX ORDER — VALSARTAN AND HYDROCHLOROTHIAZIDE 12.5; 8 MG/1; MG/1
1 TABLET, FILM COATED ORAL
Refills: 0 | DISCHARGE

## 2024-10-28 RX ORDER — FENTANYL CITRAT/DEXTROSE 5%/PF 1250MCG/50
25 PATIENT CONTROLLED ANALGESIA SYRINGE INTRAVENOUS
Refills: 0 | Status: DISCONTINUED | OUTPATIENT
Start: 2024-10-28 | End: 2024-10-28

## 2024-10-28 RX ORDER — FENTANYL CITRAT/DEXTROSE 5%/PF 1250MCG/50
50 PATIENT CONTROLLED ANALGESIA SYRINGE INTRAVENOUS
Refills: 0 | Status: DISCONTINUED | OUTPATIENT
Start: 2024-10-28 | End: 2024-10-28

## 2024-10-28 RX ORDER — SODIUM CHLORIDE 9 MG/ML
3 INJECTION, SOLUTION INTRAMUSCULAR; INTRAVENOUS; SUBCUTANEOUS EVERY 8 HOURS
Refills: 0 | Status: DISCONTINUED | OUTPATIENT
Start: 2024-10-28 | End: 2024-10-28

## 2024-10-28 RX ORDER — ACETAMINOPHEN 500 MG
1000 TABLET ORAL EVERY 6 HOURS
Refills: 0 | Status: DISCONTINUED | OUTPATIENT
Start: 2024-10-28 | End: 2024-10-29

## 2024-10-28 RX ORDER — OXYCODONE HYDROCHLORIDE 30 MG/1
5 TABLET ORAL EVERY 4 HOURS
Refills: 0 | Status: DISCONTINUED | OUTPATIENT
Start: 2024-10-28 | End: 2024-10-29

## 2024-10-28 RX ORDER — HEPARIN SODIUM 10000 [USP'U]/ML
5000 INJECTION INTRAVENOUS; SUBCUTANEOUS EVERY 8 HOURS
Refills: 0 | Status: DISCONTINUED | OUTPATIENT
Start: 2024-10-28 | End: 2024-10-29

## 2024-10-28 RX ADMIN — Medication 0.5 MILLIGRAM(S): at 13:15

## 2024-10-28 RX ADMIN — Medication 0.5 MILLIGRAM(S): at 12:50

## 2024-10-28 RX ADMIN — Medication 50 MICROGRAM(S): at 10:50

## 2024-10-28 RX ADMIN — HEPARIN SODIUM 5000 UNIT(S): 10000 INJECTION INTRAVENOUS; SUBCUTANEOUS at 18:52

## 2024-10-28 RX ADMIN — Medication 50 MICROGRAM(S): at 11:40

## 2024-10-28 RX ADMIN — Medication 400 MILLIGRAM(S): at 18:50

## 2024-10-28 RX ADMIN — Medication 125 MILLILITER(S): at 22:37

## 2024-10-28 RX ADMIN — KETOROLAC TROMETHAMINE 15 MILLIGRAM(S): 30 INJECTION INTRAMUSCULAR; INTRAVENOUS at 15:36

## 2024-10-28 RX ADMIN — KETOROLAC TROMETHAMINE 15 MILLIGRAM(S): 30 INJECTION INTRAMUSCULAR; INTRAVENOUS at 22:36

## 2024-10-28 RX ADMIN — Medication 0.5 MILLIGRAM(S): at 13:18

## 2024-10-28 RX ADMIN — Medication 0.5 MILLIGRAM(S): at 12:00

## 2024-10-28 RX ADMIN — Medication 50 MICROGRAM(S): at 11:05

## 2024-10-28 RX ADMIN — KETOROLAC TROMETHAMINE 15 MILLIGRAM(S): 30 INJECTION INTRAMUSCULAR; INTRAVENOUS at 23:20

## 2024-10-28 RX ADMIN — Medication 50 MICROGRAM(S): at 11:25

## 2024-10-28 NOTE — ASU DISCHARGE PLAN (ADULT/PEDIATRIC) - ASU DC SPECIAL INSTRUCTIONSFT
Please take over the counter Tylenol and Motrin as needed for pain.  Please take Tylenol (acetaminophen) 650mg every 6 hours as needed for pain.  Please take Motrin (ibuprofen) 600mg every 6 hours as needed for pain.  Do not exceed more than 4000mg Tylenol (acetaminophen) in 24 hours.  Do not exceed more than 2400mg Motrin (ibuprofen) in 24 hours. For pain not well controlled with Tylenol and Motrin, please take oxycodone 5mg every 8 hours as needed for more severe pain.  Do not drive or operate heavy machinery while taking oxycodone.     Activity as tolerated    Resume all home medications.      Remove dressing in 24 hours, do not remove steri strips, may shower normally starting tomorrow after taking off dressing.     Please keep all follow up appointments as directed.

## 2024-10-28 NOTE — ASU DISCHARGE PLAN (ADULT/PEDIATRIC) - FINANCIAL ASSISTANCE
[facility] provides services at a reduced cost to those who are determined to be eligible through [facility]’s financial assistance program. Information regarding [facility]’s financial assistance program can be found by going to [email or web address] or by calling [phone].

## 2024-10-28 NOTE — ASU PATIENT PROFILE, ADULT - FALL HARM RISK - TYPE OF ASSESSMENT
I have reviewed discharge instructions with the patient. The patient verbalized understanding. Patient left ED via Discharge Method: ambulatory to Home with herself. Ella [place] Opportunity for questions and clarification provided. Patient given 2 scripts. To continue your aftercare when you leave the hospital, you may receive an automated call from our care team to check in on how you are doing. This is a free service and part of our promise to provide the best care and service to meet your aftercare needs.  If you have questions, or wish to unsubscribe from this service please call [phone]. Thank you for Choosing our [facility] Emergency Department.
Admission

## 2024-10-28 NOTE — ASU DISCHARGE PLAN (ADULT/PEDIATRIC) - CARE PROVIDER_API CALL
Fab Cope  Surgery  [address] [place] [id number]  Phone: [phone]  Fax: [phone]  Follow Up Time: 1 week

## 2024-10-28 NOTE — ASU PATIENT PROFILE, ADULT - FALL HARM RISK - UNIVERSAL INTERVENTIONS
Bed in lowest position, wheels locked, appropriate side rails in place/Call bell, personal items and telephone in reach/Instruct patient to call for assistance before getting out of bed or chair/Non-slip footwear when patient is out of bed/[place] to call system/Physically safe environment - no spills, clutter or unnecessary equipment/Purposeful Proactive Rounding/Room/bathroom lighting operational, light cord in reach

## 2024-10-29 ENCOUNTER — TRANSCRIPTION ENCOUNTER (OUTPATIENT)
Age: 59
End: 2024-10-29

## 2024-10-29 VITALS
TEMPERATURE: 98 F | OXYGEN SATURATION: 97 % | DIASTOLIC BLOOD PRESSURE: 73 MMHG | HEART RATE: 78 BPM | SYSTOLIC BLOOD PRESSURE: 124 MMHG | RESPIRATION RATE: 18 BRPM

## 2024-10-29 LAB
ANION GAP SERPL CALC-SCNC: 4 MMOL/L — LOW (ref 5–17)
BASOPHILS # BLD AUTO: 0.03 K/UL — SIGNIFICANT CHANGE UP (ref 0–0.2)
BASOPHILS NFR BLD AUTO: 0.5 % — SIGNIFICANT CHANGE UP (ref 0–2)
BUN SERPL-MCNC: 13 MG/DL — SIGNIFICANT CHANGE UP (ref 7–18)
CALCIUM SERPL-MCNC: 8.9 MG/DL — SIGNIFICANT CHANGE UP (ref 8.4–10.5)
CHLORIDE SERPL-SCNC: 112 MMOL/L — HIGH (ref 96–108)
CO2 SERPL-SCNC: 28 MMOL/L — SIGNIFICANT CHANGE UP (ref 22–31)
CREAT SERPL-MCNC: 0.78 MG/DL — SIGNIFICANT CHANGE UP (ref 0.5–1.3)
EGFR: 87 ML/MIN/1.73M2 — SIGNIFICANT CHANGE UP
EOSINOPHIL # BLD AUTO: 0.29 K/UL — SIGNIFICANT CHANGE UP (ref 0–0.5)
EOSINOPHIL NFR BLD AUTO: 5.2 % — SIGNIFICANT CHANGE UP (ref 0–6)
GLUCOSE SERPL-MCNC: 110 MG/DL — HIGH (ref 70–99)
HCT VFR BLD CALC: 29.5 % — LOW (ref 34.5–45)
HGB BLD-MCNC: 9.1 G/DL — LOW (ref 11.5–15.5)
IMM GRANULOCYTES NFR BLD AUTO: 0.2 % — SIGNIFICANT CHANGE UP (ref 0–0.9)
LYMPHOCYTES # BLD AUTO: 2.04 K/UL — SIGNIFICANT CHANGE UP (ref 1–3.3)
LYMPHOCYTES # BLD AUTO: 36.5 % — SIGNIFICANT CHANGE UP (ref 13–44)
MCHC RBC-ENTMCNC: 24.7 PG — LOW (ref 27–34)
MCHC RBC-ENTMCNC: 30.8 GM/DL — LOW (ref 32–36)
MCV RBC AUTO: 79.9 FL — LOW (ref 80–100)
MONOCYTES # BLD AUTO: 0.34 K/UL — SIGNIFICANT CHANGE UP (ref 0–0.9)
MONOCYTES NFR BLD AUTO: 6.1 % — SIGNIFICANT CHANGE UP (ref 2–14)
NEUTROPHILS # BLD AUTO: 2.88 K/UL — SIGNIFICANT CHANGE UP (ref 1.8–7.4)
NEUTROPHILS NFR BLD AUTO: 51.5 % — SIGNIFICANT CHANGE UP (ref 43–77)
NRBC # BLD: 0 /100 WBCS — SIGNIFICANT CHANGE UP (ref 0–0)
PLATELET # BLD AUTO: 243 K/UL — SIGNIFICANT CHANGE UP (ref 150–400)
POTASSIUM SERPL-MCNC: 4.5 MMOL/L — SIGNIFICANT CHANGE UP (ref 3.5–5.3)
POTASSIUM SERPL-SCNC: 4.5 MMOL/L — SIGNIFICANT CHANGE UP (ref 3.5–5.3)
RBC # BLD: 3.69 M/UL — LOW (ref 3.8–5.2)
RBC # FLD: 15.9 % — HIGH (ref 10.3–14.5)
SODIUM SERPL-SCNC: 144 MMOL/L — SIGNIFICANT CHANGE UP (ref 135–145)
WBC # BLD: 5.59 K/UL — SIGNIFICANT CHANGE UP (ref 3.8–10.5)
WBC # FLD AUTO: 5.59 K/UL — SIGNIFICANT CHANGE UP (ref 3.8–10.5)

## 2024-10-29 PROCEDURE — 80048 BASIC METABOLIC PNL TOTAL CA: CPT

## 2024-10-29 PROCEDURE — 88305 TISSUE EXAM BY PATHOLOGIST: CPT

## 2024-10-29 PROCEDURE — 38792 RA TRACER ID OF SENTINL NODE: CPT | Mod: MC

## 2024-10-29 PROCEDURE — 76098 X-RAY EXAM SURGICAL SPECIMEN: CPT

## 2024-10-29 PROCEDURE — 36415 COLL VENOUS BLD VENIPUNCTURE: CPT

## 2024-10-29 PROCEDURE — 85025 COMPLETE CBC W/AUTO DIFF WBC: CPT

## 2024-10-29 PROCEDURE — 99238 HOSP IP/OBS DSCHRG MGMT 30/<: CPT

## 2024-10-29 PROCEDURE — 88307 TISSUE EXAM BY PATHOLOGIST: CPT

## 2024-10-29 RX ORDER — OXYCODONE HYDROCHLORIDE 30 MG/1
1 TABLET ORAL
Qty: 12 | Refills: 0
Start: 2024-10-29 | End: 2024-10-31

## 2024-10-29 RX ORDER — ACETAMINOPHEN 500 MG
650 TABLET ORAL EVERY 6 HOURS
Refills: 0 | Status: DISCONTINUED | OUTPATIENT
Start: 2024-10-29 | End: 2024-10-29

## 2024-10-29 RX ORDER — PANTOPRAZOLE SODIUM 40 MG/1
40 TABLET, DELAYED RELEASE ORAL
Refills: 0 | Status: DISCONTINUED | OUTPATIENT
Start: 2024-10-29 | End: 2024-10-29

## 2024-10-29 RX ORDER — GABAPENTIN 300 MG/1
300 CAPSULE ORAL EVERY 12 HOURS
Refills: 0 | Status: DISCONTINUED | OUTPATIENT
Start: 2024-10-29 | End: 2024-10-29

## 2024-10-29 RX ORDER — FAMOTIDINE 10 MG/ML
20 INJECTION INTRAVENOUS ONCE
Refills: 0 | Status: COMPLETED | OUTPATIENT
Start: 2024-10-29 | End: 2024-10-29

## 2024-10-29 RX ORDER — ALBUTEROL 90 MCG
2 AEROSOL (GRAM) INHALATION EVERY 6 HOURS
Refills: 0 | Status: DISCONTINUED | OUTPATIENT
Start: 2024-10-29 | End: 2024-10-29

## 2024-10-29 RX ORDER — ONDANSETRON HYDROCHLORIDE 2 MG/ML
4 INJECTION, SOLUTION INTRAMUSCULAR; INTRAVENOUS EVERY 6 HOURS
Refills: 0 | Status: DISCONTINUED | OUTPATIENT
Start: 2024-10-29 | End: 2024-10-29

## 2024-10-29 RX ORDER — FAMOTIDINE 10 MG/ML
20 INJECTION INTRAVENOUS DAILY
Refills: 0 | Status: DISCONTINUED | OUTPATIENT
Start: 2024-10-29 | End: 2024-10-29

## 2024-10-29 RX ORDER — DULOXETINE HYDROCHLORIDE 30 MG/1
30 CAPSULE, DELAYED RELEASE ORAL DAILY
Refills: 0 | Status: DISCONTINUED | OUTPATIENT
Start: 2024-10-29 | End: 2024-10-29

## 2024-10-29 RX ADMIN — PANTOPRAZOLE SODIUM 40 MILLIGRAM(S): 40 TABLET, DELAYED RELEASE ORAL at 08:04

## 2024-10-29 RX ADMIN — Medication 1000 MILLIGRAM(S): at 02:10

## 2024-10-29 RX ADMIN — HEPARIN SODIUM 5000 UNIT(S): 10000 INJECTION INTRAVENOUS; SUBCUTANEOUS at 01:38

## 2024-10-29 RX ADMIN — FAMOTIDINE 20 MILLIGRAM(S): 10 INJECTION INTRAVENOUS at 15:47

## 2024-10-29 RX ADMIN — ONDANSETRON HYDROCHLORIDE 4 MILLIGRAM(S): 2 INJECTION, SOLUTION INTRAMUSCULAR; INTRAVENOUS at 14:45

## 2024-10-29 RX ADMIN — DULOXETINE HYDROCHLORIDE 30 MILLIGRAM(S): 30 CAPSULE, DELAYED RELEASE ORAL at 12:01

## 2024-10-29 RX ADMIN — Medication 400 MILLIGRAM(S): at 01:37

## 2024-10-29 RX ADMIN — Medication 1000 MILLIGRAM(S): at 07:00

## 2024-10-29 RX ADMIN — Medication 400 MILLIGRAM(S): at 05:46

## 2024-10-29 NOTE — PROGRESS NOTE ADULT - SUBJECTIVE AND OBJECTIVE BOX
POSTOP CHECK     I have seen and examined the patient at bedside. No acute events postoperatively. Afebrile, tolerates the diet. The patient denies nausea and vomiting. Tolerates regular diet.   The wound clean, dry, intact.    Vital Signs Last 24 Hrs  T(C): 36.6 (28 Oct 2024 14:35), Max: 36.7 (28 Oct 2024 08:18)  T(F): 97.8 (28 Oct 2024 14:35), Max: 98.1 (28 Oct 2024 08:18)  HR: 74 (28 Oct 2024 14:35) (67 - 96)  BP: 112/61 (28 Oct 2024 14:35) (96/49 - 125/72)  BP(mean): 90 (28 Oct 2024 12:15) (65 - 90)  RR: 16 (28 Oct 2024 14:35) (11 - 22)  SpO2: 99% (28 Oct 2024 14:35) (95% - 100%)    Parameters below as of 28 Oct 2024 14:35  Patient On (Oxygen Delivery Method): nasal cannula  O2 Flow (L/min): 2    I&O's Detail    28 Oct 2024 07:01  -  28 Oct 2024 16:38  --------------------------------------------------------  IN:    Lactated Ringers Bolus: 1150 mL  Total IN: 1150 mL    OUT:  Total OUT: 0 mL    Total NET: 1150 mL    Physical Exam:  General: not in acute distress, AAOx3, resting in bed comfortably  Respiratory: normal respiratory effort, no accessory muscle use  Abdomen: soft, non-tender, non-distended  MSK: FROM x4 extremities    MEDICATIONS  (STANDING):  acetaminophen   IVPB .. 1000 milliGRAM(s) IV Intermittent every 6 hours  heparin   Injectable 5000 Unit(s) SubCutaneous every 8 hours    MEDICATIONS  (PRN):  ketorolac   Injectable 15 milliGRAM(s) IV Push every 6 hours PRN Moderate Pain (4 - 6)  oxyCODONE    IR 5 milliGRAM(s) Oral every 4 hours PRN Severe Pain (7 - 10)      
Vital Signs Last 24 Hrs  T(C): 36.4 (29 Oct 2024 06:04), Max: 36.7 (28 Oct 2024 08:18)  T(F): 97.6 (29 Oct 2024 06:04), Max: 98.1 (28 Oct 2024 08:18)  HR: 75 (29 Oct 2024 06:04) (67 - 96)  BP: 96/61 (29 Oct 2024 06:04) (96/49 - 125/72)  BP(mean): 73 (29 Oct 2024 06:04) (65 - 95)  RR: 18 (29 Oct 2024 06:04) (11 - 22)  SpO2: 97% (29 Oct 2024 06:04) (95% - 100%)    Parameters below as of 29 Oct 2024 06:04  Patient On (Oxygen Delivery Method): nasal cannula  O2 Flow (L/min): 2      I&O's Detail    28 Oct 2024 07:01  -  29 Oct 2024 07:00  --------------------------------------------------------  IN:    Lactated Ringers Bolus: 1150 mL  Total IN: 1150 mL    OUT:  Total OUT: 0 mL    Total NET: 1150 mL                                9.1    5.59  )-----------( 243      ( 29 Oct 2024 05:34 )             29.5       10-29    144  |  112[H]  |  13  ----------------------------<  110[H]  4.5   |  28  |  0.78    Ca    8.9      29 Oct 2024 05:34    Dressing in place  Good arm motion            PLAN:  Discharge home today  Patient may shower today  RTO 2 weeks  Instructions given        
59F POD#1 s/p right lumpectomy with SLNB.  No acute events, pain controlled, VSS.  Ambulated.  Urinated post-op.  Tolerating diet.      Vital Signs Last 24 Hrs  T(C): 36.4 (29 Oct 2024 06:04), Max: 36.7 (28 Oct 2024 08:18)  T(F): 97.6 (29 Oct 2024 06:04), Max: 98.1 (28 Oct 2024 08:18)  HR: 75 (29 Oct 2024 06:04) (67 - 96)  BP: 96/61 (29 Oct 2024 06:04) (96/49 - 125/72)  BP(mean): 73 (29 Oct 2024 06:04) (65 - 95)  RR: 18 (29 Oct 2024 06:04) (11 - 22)  SpO2: 97% (29 Oct 2024 06:04) (95% - 100%)    Parameters below as of 29 Oct 2024 06:04  Patient On (Oxygen Delivery Method): nasal cannula  O2 Flow (L/min): 2      Physical:   GA: AAOx3, NAD  CVS: RRR  Pulm: nonlabored  Abd: soft, nontender, nondistended.   Chest: dressing c/d/i, appropriate incisional tenderness.                           9.1    5.59  )-----------( 243      ( 29 Oct 2024 05:34 )             29.5   10-29    144  |  112[H]  |  13  ----------------------------<  110[H]  4.5   |  28  |  0.78    Ca    8.9      29 Oct 2024 05:34

## 2024-10-29 NOTE — DISCHARGE NOTE NURSING/CASE MANAGEMENT/SOCIAL WORK - FINANCIAL ASSISTANCE
[facility] provides services at a reduced cost to those who are determined to be eligible through [facility]’s financial assistance program. Information regarding [facility]’s financial assistance program can be found by going to [email or web address] or by calling [phone].

## 2024-10-29 NOTE — DISCHARGE NOTE PROVIDER - NSDCMRMEDTOKEN_GEN_ALL_CORE_FT
Albuterol (Eqv-ProAir HFA) 90 mcg/inh inhalation aerosol: 2 puff(s) inhaled every 6 hours  DULoxetine 30 mg oral delayed release capsule: 1 tab(s) orally once a day, As Needed  gabapentin 600 mg/24 hours oral tablet, extended release: 1 tab(s) orally once a day  meloxicam 15 mg oral tablet: 1 tab(s) orally once a day  omeprazole 40 mg oral delayed release capsule: 1 cap(s) orally once a day  oxyCODONE 5 mg oral tablet: 1 tab(s) orally 4 times a day MDD: 4  tamsulosin 0.4 mg oral capsule: 1 cap(s) orally once a day  valsartan-hydrochlorothiazide 320 mg-12.5 mg oral tablet: 1 tab(s) orally once a day

## 2024-10-29 NOTE — DISCHARGE NOTE PROVIDER - NSDCCPTREATMENT_GEN_ALL_CORE_FT
PRINCIPAL PROCEDURE  Procedure: Breast lumpectomy with sentinel node biopsy  Findings and Treatment: Right

## 2024-10-29 NOTE — DISCHARGE NOTE NURSING/CASE MANAGEMENT/SOCIAL WORK - NSDCPEFALRISK_GEN_ALL_CORE
For information on Fall & Injury Prevention, visit: [email or web address] OR  [email or web address] OR  https://www.cdc.gov/steadi/patient.html

## 2024-10-29 NOTE — DISCHARGE NOTE NURSING/CASE MANAGEMENT/SOCIAL WORK - PATIENT PORTAL LINK FT
You can access the FollowMyHealth Patient Portal offered by [facility] by registering at the following website: [email or web address]. By joining [employer]’s FollowMyHealth portal, you will also be able to view your health information using other applications (apps) compatible with our system.

## 2024-10-29 NOTE — DISCHARGE NOTE PROVIDER - NSDCFUADDINST_GEN_ALL_CORE_FT
Please take over the counter Tylenol and Motrin as needed for pain.  Please take Tylenol (acetaminophen) 650mg every 6 hours as needed for pain.  Please take Motrin (ibuprofen) 600mg every 6 hours as needed for pain.  Do not exceed more than 4000mg Tylenol (acetaminophen) in 24 hours.  Do not exceed more than 2400mg Motrin (ibuprofen) in 24 hours. For pain not well controlled with Tylenol and Motrin, please take oxycodone 5mg every 8 hours as needed for more severe pain.  Do not drive or operate heavy machinery while taking oxycodone.     Activity as tolerated.  Gradually increase activity.     Resume all home medications.      Please keep all follow up appointments as directed.

## 2024-10-29 NOTE — DISCHARGE NOTE PROVIDER - HOSPITAL COURSE
66F s/p right breast lumpectomy with sentinel lymph node biopsy.  Significant pain post-op opting to stay for pain control.  Recovered well overnight, planned for discharge today.

## 2024-10-29 NOTE — DISCHARGE NOTE PROVIDER - CARE PROVIDER_API CALL
Fab Cope  Surgery  [address] [place] [id number]  Phone: [phone]  Fax: [phone]  Follow Up Time: 1 week

## 2024-10-29 NOTE — PROGRESS NOTE ADULT - ASSESSMENT
59-year-old female, s/p R Lumpectomy and SLNB   She tolerated the procedure well     - Reg diet  - Ambulate  - Follow up postop void   - Discharge tomorrow   - DVT ppx 
A/P:   66F s/p right lumpectomy with SLNB yesterday. Recovering well  -DC home today  -pain control   -f/u in clinic in 1 week

## 2024-10-31 LAB — SURGICAL PATHOLOGY STUDY: SIGNIFICANT CHANGE UP

## 2024-11-12 ENCOUNTER — APPOINTMENT (OUTPATIENT)
Dept: SURGICAL ONCOLOGY | Facility: CLINIC | Age: 59
End: 2024-11-12

## 2024-11-12 VITALS
BODY MASS INDEX: 40.3 KG/M2 | DIASTOLIC BLOOD PRESSURE: 93 MMHG | WEIGHT: 192 LBS | HEART RATE: 85 BPM | SYSTOLIC BLOOD PRESSURE: 171 MMHG | HEIGHT: 58 IN | OXYGEN SATURATION: 96 %

## 2024-11-12 DIAGNOSIS — C50.919 MALIGNANT NEOPLASM OF UNSPECIFIED SITE OF UNSPECIFIED FEMALE BREAST: ICD-10-CM

## 2024-11-12 PROCEDURE — 99214 OFFICE O/P EST MOD 30 MIN: CPT

## 2024-11-18 ENCOUNTER — NON-APPOINTMENT (OUTPATIENT)
Age: 59
End: 2024-11-18

## 2024-11-19 ENCOUNTER — NON-APPOINTMENT (OUTPATIENT)
Age: 59
End: 2024-11-19

## 2024-11-19 ENCOUNTER — OUTPATIENT (OUTPATIENT)
Dept: OUTPATIENT SERVICES | Facility: HOSPITAL | Age: 59
LOS: 1 days | Discharge: HOME | End: 2024-11-19
Payer: COMMERCIAL

## 2024-11-19 ENCOUNTER — APPOINTMENT (OUTPATIENT)
Dept: RADIATION ONCOLOGY | Facility: CLINIC | Age: 59
End: 2024-11-19
Payer: COMMERCIAL

## 2024-11-19 VITALS — HEIGHT: 58 IN | RESPIRATION RATE: 18 BRPM | BODY MASS INDEX: 39.88 KG/M2 | WEIGHT: 190 LBS

## 2024-11-19 DIAGNOSIS — Z98.84 BARIATRIC SURGERY STATUS: Chronic | ICD-10-CM

## 2024-11-19 DIAGNOSIS — Z82.49 FAMILY HISTORY OF ISCHEMIC HEART DISEASE AND OTHER DISEASES OF THE CIRCULATORY SYSTEM: ICD-10-CM

## 2024-11-19 DIAGNOSIS — Z98.890 OTHER SPECIFIED POSTPROCEDURAL STATES: Chronic | ICD-10-CM

## 2024-11-19 DIAGNOSIS — C50.811 MALIGNANT NEOPLASM OF OVERLAPPING SITES OF RIGHT FEMALE BREAST: ICD-10-CM

## 2024-11-19 DIAGNOSIS — Z90.711 ACQUIRED ABSENCE OF UTERUS WITH REMAINING CERVICAL STUMP: Chronic | ICD-10-CM

## 2024-11-19 DIAGNOSIS — Z80.3 FAMILY HISTORY OF MALIGNANT NEOPLASM OF BREAST: ICD-10-CM

## 2024-11-19 PROCEDURE — 99204 OFFICE O/P NEW MOD 45 MIN: CPT

## 2024-11-20 ENCOUNTER — NON-APPOINTMENT (OUTPATIENT)
Age: 59
End: 2024-11-20

## 2024-11-25 PROCEDURE — 77290 THER RAD SIMULAJ FIELD CPLX: CPT | Mod: 26

## 2024-11-25 PROCEDURE — 77333 RADIATION TREATMENT AID(S): CPT | Mod: 26,59

## 2024-11-25 PROCEDURE — 77263 THER RADIOLOGY TX PLNG CPLX: CPT

## 2024-11-25 PROCEDURE — 77334 RADIATION TREATMENT AID(S): CPT | Mod: 26

## 2024-12-03 ENCOUNTER — APPOINTMENT (OUTPATIENT)
Dept: RADIOLOGY | Facility: CLINIC | Age: 59
End: 2024-12-03
Payer: COMMERCIAL

## 2024-12-03 ENCOUNTER — RESULT REVIEW (OUTPATIENT)
Age: 59
End: 2024-12-03

## 2024-12-03 PROCEDURE — 77080 DXA BONE DENSITY AXIAL: CPT

## 2024-12-04 PROCEDURE — 77334 RADIATION TREATMENT AID(S): CPT

## 2024-12-04 PROCEDURE — 77295 3-D RADIOTHERAPY PLAN: CPT

## 2024-12-04 PROCEDURE — 77300 RADIATION THERAPY DOSE PLAN: CPT

## 2024-12-10 ENCOUNTER — APPOINTMENT (OUTPATIENT)
Dept: HEMATOLOGY ONCOLOGY | Facility: CLINIC | Age: 59
End: 2024-12-10

## 2024-12-11 ENCOUNTER — NON-APPOINTMENT (OUTPATIENT)
Age: 59
End: 2024-12-11

## 2024-12-12 ENCOUNTER — APPOINTMENT (OUTPATIENT)
Dept: HEMATOLOGY ONCOLOGY | Facility: CLINIC | Age: 59
End: 2024-12-12

## 2024-12-18 ENCOUNTER — NON-APPOINTMENT (OUTPATIENT)
Age: 59
End: 2024-12-18

## 2024-12-24 ENCOUNTER — APPOINTMENT (OUTPATIENT)
Dept: ENDOCRINOLOGY | Facility: CLINIC | Age: 59
End: 2024-12-24
Payer: COMMERCIAL

## 2024-12-24 VITALS
DIASTOLIC BLOOD PRESSURE: 80 MMHG | OXYGEN SATURATION: 95 % | HEIGHT: 58 IN | WEIGHT: 187 LBS | RESPIRATION RATE: 12 BRPM | BODY MASS INDEX: 39.25 KG/M2 | SYSTOLIC BLOOD PRESSURE: 132 MMHG | HEART RATE: 82 BPM

## 2024-12-24 DIAGNOSIS — Z80.3 FAMILY HISTORY OF MALIGNANT NEOPLASM OF BREAST: ICD-10-CM

## 2024-12-24 DIAGNOSIS — M85.80 OTHER SPECIFIED DISORDERS OF BONE DENSITY AND STRUCTURE, UNSPECIFIED SITE: ICD-10-CM

## 2024-12-24 DIAGNOSIS — R73.03 PREDIABETES.: ICD-10-CM

## 2024-12-24 PROCEDURE — 99205 OFFICE O/P NEW HI 60 MIN: CPT

## 2024-12-24 PROCEDURE — 36415 COLL VENOUS BLD VENIPUNCTURE: CPT

## 2024-12-29 PROBLEM — R73.03 PREDIABETES: Status: ACTIVE | Noted: 2024-12-29

## 2024-12-29 PROBLEM — M85.80 OSTEOPENIA, UNSPECIFIED LOCATION: Status: ACTIVE | Noted: 2024-12-29

## 2025-01-02 LAB
25(OH)D3 SERPL-MCNC: 33.3 NG/ML
ALBUMIN SERPL ELPH-MCNC: 4.1 G/DL
ALP BLD-CCNC: 91 U/L
ALT SERPL-CCNC: 7 U/L
ANION GAP SERPL CALC-SCNC: 14 MMOL/L
AST SERPL-CCNC: 18 U/L
BASOPHILS # BLD AUTO: 0.03 K/UL
BASOPHILS NFR BLD AUTO: 0.4 %
BILIRUB SERPL-MCNC: 0.2 MG/DL
BUN SERPL-MCNC: 15 MG/DL
CALCIUM SERPL-MCNC: 10.2 MG/DL
CHLORIDE SERPL-SCNC: 104 MMOL/L
CHOLEST SERPL-MCNC: 217 MG/DL
CO2 SERPL-SCNC: 25 MMOL/L
CREAT SERPL-MCNC: 0.77 MG/DL
EGFR: 88 ML/MIN/1.73M2
EOSINOPHIL # BLD AUTO: 0.4 K/UL
EOSINOPHIL NFR BLD AUTO: 5.8 %
ESTIMATED AVERAGE GLUCOSE: 128 MG/DL
GLUCOSE SERPL-MCNC: 117 MG/DL
HBA1C MFR BLD HPLC: 6.1 %
HCT VFR BLD CALC: 36.7 %
HDLC SERPL-MCNC: 88 MG/DL
HGB BLD-MCNC: 10.7 G/DL
IMM GRANULOCYTES NFR BLD AUTO: 0.3 %
LDLC SERPL CALC-MCNC: 118 MG/DL
LYMPHOCYTES # BLD AUTO: 2.36 K/UL
LYMPHOCYTES NFR BLD AUTO: 34.5 %
MAN DIFF?: NORMAL
MCHC RBC-ENTMCNC: 23.2 PG
MCHC RBC-ENTMCNC: 29.2 G/DL
MCV RBC AUTO: 79.6 FL
MONOCYTES # BLD AUTO: 0.44 K/UL
MONOCYTES NFR BLD AUTO: 6.4 %
NEUTROPHILS # BLD AUTO: 3.59 K/UL
NEUTROPHILS NFR BLD AUTO: 52.6 %
NONHDLC SERPL-MCNC: 129 MG/DL
PLATELET # BLD AUTO: 337 K/UL
POTASSIUM SERPL-SCNC: 4.5 MMOL/L
PROT SERPL-MCNC: 8.3 G/DL
RBC # BLD: 4.61 M/UL
RBC # FLD: 17.3 %
SODIUM SERPL-SCNC: 142 MMOL/L
TRIGL SERPL-MCNC: 63 MG/DL
VIT B12 SERPL-MCNC: 194 PG/ML
WBC # FLD AUTO: 6.84 K/UL

## 2025-01-06 ENCOUNTER — NON-APPOINTMENT (OUTPATIENT)
Age: 60
End: 2025-01-06

## 2025-01-07 ENCOUNTER — APPOINTMENT (OUTPATIENT)
Dept: GASTROENTEROLOGY | Facility: CLINIC | Age: 60
End: 2025-01-07
Payer: COMMERCIAL

## 2025-01-07 ENCOUNTER — NON-APPOINTMENT (OUTPATIENT)
Age: 60
End: 2025-01-07

## 2025-01-07 ENCOUNTER — APPOINTMENT (OUTPATIENT)
Dept: SURGICAL ONCOLOGY | Facility: CLINIC | Age: 60
End: 2025-01-07
Payer: COMMERCIAL

## 2025-01-07 VITALS
DIASTOLIC BLOOD PRESSURE: 77 MMHG | HEIGHT: 58 IN | WEIGHT: 188 LBS | HEART RATE: 97 BPM | OXYGEN SATURATION: 95 % | BODY MASS INDEX: 39.47 KG/M2 | RESPIRATION RATE: 16 BRPM | SYSTOLIC BLOOD PRESSURE: 130 MMHG

## 2025-01-07 VITALS
DIASTOLIC BLOOD PRESSURE: 95 MMHG | BODY MASS INDEX: 39.47 KG/M2 | HEIGHT: 58 IN | WEIGHT: 188 LBS | HEART RATE: 87 BPM | OXYGEN SATURATION: 95 % | SYSTOLIC BLOOD PRESSURE: 168 MMHG | TEMPERATURE: 97.5 F

## 2025-01-07 DIAGNOSIS — R09.82 POSTNASAL DRIP: ICD-10-CM

## 2025-01-07 DIAGNOSIS — C50.911 MALIGNANT NEOPLASM OF UNSPECIFIED SITE OF RIGHT FEMALE BREAST: ICD-10-CM

## 2025-01-07 DIAGNOSIS — K80.50 CALCULUS OF BILE DUCT W/OUT CHOLANGITIS OR CHOLECYSTITIS W/OUT OBSTRUCTION: ICD-10-CM

## 2025-01-07 DIAGNOSIS — K21.9 GASTRO-ESOPHAGEAL REFLUX DISEASE W/OUT ESOPHAGITIS: ICD-10-CM

## 2025-01-07 PROCEDURE — 99204 OFFICE O/P NEW MOD 45 MIN: CPT

## 2025-01-07 PROCEDURE — 99024 POSTOP FOLLOW-UP VISIT: CPT

## 2025-01-07 RX ORDER — OMEPRAZOLE 40 MG/1
40 CAPSULE, DELAYED RELEASE ORAL DAILY
Qty: 1 | Refills: 3 | Status: ACTIVE | COMMUNITY
Start: 2025-01-07 | End: 1900-01-01

## 2025-01-07 RX ORDER — URSODIOL 300 MG/1
300 CAPSULE ORAL
Qty: 60 | Refills: 0 | Status: ACTIVE | COMMUNITY
Start: 2025-01-07 | End: 1900-01-01

## 2025-01-07 RX ORDER — FLUTICASONE PROPIONATE 50 UG/1
50 SPRAY, METERED NASAL TWICE DAILY
Qty: 1 | Refills: 3 | Status: ACTIVE | COMMUNITY
Start: 2025-01-07 | End: 1900-01-01

## 2025-01-13 PROCEDURE — 77280 THER RAD SIMULAJ FIELD SMPL: CPT

## 2025-01-14 PROCEDURE — G6012: CPT

## 2025-01-14 PROCEDURE — 77014: CPT

## 2025-01-14 PROCEDURE — 77427 RADIATION TX MANAGEMENT X5: CPT

## 2025-01-15 PROCEDURE — G6012: CPT

## 2025-01-16 ENCOUNTER — NON-APPOINTMENT (OUTPATIENT)
Age: 60
End: 2025-01-16

## 2025-01-16 PROCEDURE — G6012: CPT

## 2025-01-16 PROCEDURE — 77014: CPT

## 2025-01-17 PROCEDURE — 77014: CPT

## 2025-01-17 PROCEDURE — G6012: CPT

## 2025-01-21 PROCEDURE — 77014: CPT

## 2025-01-21 PROCEDURE — 77336 RADIATION PHYSICS CONSULT: CPT

## 2025-01-21 PROCEDURE — G6012: CPT

## 2025-01-22 PROCEDURE — G6012: CPT

## 2025-01-22 PROCEDURE — 77427 RADIATION TX MANAGEMENT X5: CPT

## 2025-01-23 ENCOUNTER — NON-APPOINTMENT (OUTPATIENT)
Age: 60
End: 2025-01-23

## 2025-01-23 ENCOUNTER — APPOINTMENT (OUTPATIENT)
Facility: CLINIC | Age: 60
End: 2025-01-23

## 2025-01-23 PROCEDURE — 77014: CPT

## 2025-01-23 PROCEDURE — G6012: CPT

## 2025-01-24 PROCEDURE — G6012: CPT

## 2025-01-24 PROCEDURE — 77014: CPT

## 2025-01-27 PROCEDURE — G6012: CPT

## 2025-01-27 PROCEDURE — 77014: CPT

## 2025-01-28 PROCEDURE — G6012: CPT

## 2025-01-28 PROCEDURE — 77336 RADIATION PHYSICS CONSULT: CPT

## 2025-01-28 PROCEDURE — 77014: CPT

## 2025-01-29 PROCEDURE — 77280 THER RAD SIMULAJ FIELD SMPL: CPT

## 2025-01-29 PROCEDURE — 77427 RADIATION TX MANAGEMENT X5: CPT

## 2025-01-29 PROCEDURE — G6012: CPT

## 2025-01-30 ENCOUNTER — NON-APPOINTMENT (OUTPATIENT)
Age: 60
End: 2025-01-30

## 2025-01-30 PROCEDURE — 77014: CPT

## 2025-01-30 PROCEDURE — G6012: CPT

## 2025-01-30 RX ORDER — SILVER SULFADIAZINE 10 MG/G
1 CREAM TOPICAL TWICE DAILY
Qty: 2 | Refills: 1 | Status: ACTIVE | COMMUNITY
Start: 2025-01-30 | End: 1900-01-01

## 2025-01-31 PROCEDURE — 77014: CPT

## 2025-01-31 PROCEDURE — G6012: CPT

## 2025-02-03 PROCEDURE — G6012: CPT

## 2025-02-03 PROCEDURE — 77014: CPT

## 2025-02-04 PROCEDURE — G6012: CPT

## 2025-02-04 PROCEDURE — 77336 RADIATION PHYSICS CONSULT: CPT

## 2025-02-04 PROCEDURE — 77014: CPT

## 2025-02-05 PROCEDURE — 77427 RADIATION TX MANAGEMENT X5: CPT

## 2025-02-05 PROCEDURE — 77014: CPT

## 2025-02-05 PROCEDURE — G6012: CPT

## 2025-02-06 ENCOUNTER — NON-APPOINTMENT (OUTPATIENT)
Age: 60
End: 2025-02-06

## 2025-02-06 PROCEDURE — G6012: CPT

## 2025-02-07 PROCEDURE — G6012: CPT

## 2025-02-10 PROCEDURE — G6012: CPT

## 2025-02-10 PROCEDURE — 77014: CPT

## 2025-02-11 PROCEDURE — 77336 RADIATION PHYSICS CONSULT: CPT

## 2025-02-11 PROCEDURE — G6012: CPT

## 2025-02-11 PROCEDURE — 77014: CPT

## 2025-03-11 ENCOUNTER — APPOINTMENT (OUTPATIENT)
Dept: RADIATION ONCOLOGY | Facility: CLINIC | Age: 60
End: 2025-03-11
Payer: COMMERCIAL

## 2025-03-11 PROCEDURE — 99024 POSTOP FOLLOW-UP VISIT: CPT

## 2025-04-08 ENCOUNTER — APPOINTMENT (OUTPATIENT)
Dept: SURGICAL ONCOLOGY | Facility: CLINIC | Age: 60
End: 2025-04-08

## 2025-04-08 DIAGNOSIS — C50.911 MALIGNANT NEOPLASM OF UNSPECIFIED SITE OF RIGHT FEMALE BREAST: ICD-10-CM

## 2025-04-22 ENCOUNTER — APPOINTMENT (OUTPATIENT)
Dept: SURGICAL ONCOLOGY | Facility: CLINIC | Age: 60
End: 2025-04-22

## 2025-04-22 DIAGNOSIS — C50.919 MALIGNANT NEOPLASM OF UNSPECIFIED SITE OF UNSPECIFIED FEMALE BREAST: ICD-10-CM

## 2025-05-06 ENCOUNTER — APPOINTMENT (OUTPATIENT)
Dept: SURGICAL ONCOLOGY | Facility: CLINIC | Age: 60
End: 2025-05-06
Payer: COMMERCIAL

## 2025-05-06 ENCOUNTER — RESULT REVIEW (OUTPATIENT)
Age: 60
End: 2025-05-06

## 2025-05-06 VITALS
RESPIRATION RATE: 16 BRPM | DIASTOLIC BLOOD PRESSURE: 78 MMHG | WEIGHT: 188 LBS | OXYGEN SATURATION: 95 % | SYSTOLIC BLOOD PRESSURE: 116 MMHG | HEART RATE: 104 BPM | HEIGHT: 58 IN | BODY MASS INDEX: 39.47 KG/M2

## 2025-05-06 DIAGNOSIS — C50.911 MALIGNANT NEOPLASM OF UNSPECIFIED SITE OF RIGHT FEMALE BREAST: ICD-10-CM

## 2025-05-06 PROCEDURE — 99214 OFFICE O/P EST MOD 30 MIN: CPT

## 2025-05-16 ENCOUNTER — APPOINTMENT (OUTPATIENT)
Dept: ULTRASOUND IMAGING | Facility: CLINIC | Age: 60
End: 2025-05-16
Payer: COMMERCIAL

## 2025-05-16 ENCOUNTER — RESULT REVIEW (OUTPATIENT)
Age: 60
End: 2025-05-16

## 2025-05-16 ENCOUNTER — APPOINTMENT (OUTPATIENT)
Dept: MAMMOGRAPHY | Facility: CLINIC | Age: 60
End: 2025-05-16
Payer: COMMERCIAL

## 2025-05-16 PROCEDURE — 77062 BREAST TOMOSYNTHESIS BI: CPT

## 2025-05-16 PROCEDURE — 77066 DX MAMMO INCL CAD BI: CPT

## 2025-05-16 PROCEDURE — 76641 ULTRASOUND BREAST COMPLETE: CPT | Mod: 50

## 2025-05-20 ENCOUNTER — APPOINTMENT (OUTPATIENT)
Dept: DERMATOLOGY | Facility: CLINIC | Age: 60
End: 2025-05-20
Payer: COMMERCIAL

## 2025-05-20 VITALS — WEIGHT: 186 LBS | HEIGHT: 58 IN | BODY MASS INDEX: 39.04 KG/M2

## 2025-05-20 DIAGNOSIS — L72.0 EPIDERMAL CYST: ICD-10-CM

## 2025-05-20 DIAGNOSIS — D18.01 HEMANGIOMA OF SKIN AND SUBCUTANEOUS TISSUE: ICD-10-CM

## 2025-05-20 DIAGNOSIS — L81.9 DISORDER OF PIGMENTATION, UNSPECIFIED: ICD-10-CM

## 2025-05-20 PROCEDURE — 99204 OFFICE O/P NEW MOD 45 MIN: CPT

## 2025-05-20 RX ORDER — HYDROCORTISONE 1 %
12 CREAM (GRAM) TOPICAL
Qty: 1 | Refills: 1 | Status: ACTIVE | COMMUNITY
Start: 2025-05-20 | End: 1900-01-01

## 2025-08-12 ENCOUNTER — APPOINTMENT (OUTPATIENT)
Dept: SURGICAL ONCOLOGY | Facility: CLINIC | Age: 60
End: 2025-08-12
Payer: COMMERCIAL

## 2025-08-12 VITALS
SYSTOLIC BLOOD PRESSURE: 159 MMHG | HEIGHT: 58 IN | OXYGEN SATURATION: 97 % | WEIGHT: 186 LBS | HEART RATE: 85 BPM | RESPIRATION RATE: 16 BRPM | DIASTOLIC BLOOD PRESSURE: 80 MMHG | BODY MASS INDEX: 39.04 KG/M2

## 2025-08-12 DIAGNOSIS — C50.911 MALIGNANT NEOPLASM OF UNSPECIFIED SITE OF RIGHT FEMALE BREAST: ICD-10-CM

## 2025-08-12 PROCEDURE — 99214 OFFICE O/P EST MOD 30 MIN: CPT

## 2025-09-09 ENCOUNTER — APPOINTMENT (OUTPATIENT)
Dept: RADIATION ONCOLOGY | Facility: CLINIC | Age: 60
End: 2025-09-09
Payer: COMMERCIAL

## 2025-09-09 VITALS — WEIGHT: 186 LBS | RESPIRATION RATE: 18 BRPM | HEIGHT: 58 IN | BODY MASS INDEX: 39.04 KG/M2

## 2025-09-09 PROCEDURE — 99214 OFFICE O/P EST MOD 30 MIN: CPT

## (undated) DEVICE — DRAPE 1/2 SHEET 40X57"

## (undated) DEVICE — GLV 7.5 PROTEXIS (BLUE)

## (undated) DEVICE — DRAPE LIGHT HANDLE COVER (BLUE)

## (undated) DEVICE — DRAPE LAPAROTOMY TRANSVERSE

## (undated) DEVICE — PACK MINOR NO DRAPE

## (undated) DEVICE — WARMING BLANKET LOWER ADULT

## (undated) DEVICE — GLV 7 PROTEXIS (WHITE)

## (undated) DEVICE — SUT POLYSORB 2-0 30" GS-21 UNDYED

## (undated) DEVICE — GOWN XL

## (undated) DEVICE — ELCTR GROUNDING PAD ADULT COVIDIEN

## (undated) DEVICE — DRAPE TOWEL BLUE 17" X 24"

## (undated) DEVICE — VENODYNE/SCD SLEEVE CALF MEDIUM

## (undated) DEVICE — SUT BIOSYN 4-0 18" P-12

## (undated) DEVICE — NDL HYPO SAFE 25G X 1.5" (ORANGE)

## (undated) DEVICE — DRSG CURITY GAUZE SPONGE 4 X 4" 12-PLY

## (undated) DEVICE — SUT SOFSILK 2-0 18" C-15

## (undated) DEVICE — SUT POLYSORB 3-0 30" V-20 UNDYED

## (undated) DEVICE — SOL IRR POUR H2O 1000ML

## (undated) DEVICE — SOL IRR POUR H2O 1500ML

## (undated) DEVICE — DRSG TEGADERM 4X4.75"

## (undated) DEVICE — FOR-ESU VALLEYLAB T7E14999DX: Type: DURABLE MEDICAL EQUIPMENT

## (undated) DEVICE — DRSG MASTISOL